# Patient Record
Sex: MALE | Race: WHITE | NOT HISPANIC OR LATINO | Employment: OTHER | URBAN - METROPOLITAN AREA
[De-identification: names, ages, dates, MRNs, and addresses within clinical notes are randomized per-mention and may not be internally consistent; named-entity substitution may affect disease eponyms.]

---

## 2017-06-27 ENCOUNTER — LAB REQUISITION (OUTPATIENT)
Dept: LAB | Facility: HOSPITAL | Age: 82
End: 2017-06-27
Payer: MEDICARE

## 2017-06-27 DIAGNOSIS — N39.0 URINARY TRACT INFECTION: ICD-10-CM

## 2017-06-27 PROCEDURE — 87077 CULTURE AEROBIC IDENTIFY: CPT | Performed by: UROLOGY

## 2017-06-27 PROCEDURE — 87186 SC STD MICRODIL/AGAR DIL: CPT | Performed by: UROLOGY

## 2017-06-27 PROCEDURE — 87086 URINE CULTURE/COLONY COUNT: CPT | Performed by: UROLOGY

## 2017-06-29 LAB — BACTERIA UR CULT: NORMAL

## 2018-06-27 DIAGNOSIS — N40.1 BPH WITH OBSTRUCTION/LOWER URINARY TRACT SYMPTOMS: Primary | ICD-10-CM

## 2018-06-27 DIAGNOSIS — N13.8 BPH WITH OBSTRUCTION/LOWER URINARY TRACT SYMPTOMS: Primary | ICD-10-CM

## 2018-06-27 RX ORDER — FINASTERIDE 5 MG/1
5 TABLET, FILM COATED ORAL DAILY
Qty: 30 TABLET | Refills: 0 | Status: SHIPPED | OUTPATIENT
Start: 2018-06-27 | End: 2018-07-09 | Stop reason: SDUPTHER

## 2018-07-09 ENCOUNTER — OFFICE VISIT (OUTPATIENT)
Dept: UROLOGY | Facility: MEDICAL CENTER | Age: 83
End: 2018-07-09
Payer: MEDICARE

## 2018-07-09 VITALS
HEIGHT: 67 IN | WEIGHT: 182 LBS | BODY MASS INDEX: 28.56 KG/M2 | DIASTOLIC BLOOD PRESSURE: 70 MMHG | SYSTOLIC BLOOD PRESSURE: 130 MMHG

## 2018-07-09 DIAGNOSIS — N13.8 BPH WITH OBSTRUCTION/LOWER URINARY TRACT SYMPTOMS: ICD-10-CM

## 2018-07-09 DIAGNOSIS — N40.1 BENIGN PROSTATIC HYPERPLASIA WITH LOWER URINARY TRACT SYMPTOMS, SYMPTOM DETAILS UNSPECIFIED: Primary | ICD-10-CM

## 2018-07-09 DIAGNOSIS — N40.1 BPH WITH OBSTRUCTION/LOWER URINARY TRACT SYMPTOMS: ICD-10-CM

## 2018-07-09 PROCEDURE — 99214 OFFICE O/P EST MOD 30 MIN: CPT | Performed by: UROLOGY

## 2018-07-09 RX ORDER — LEVOTHYROXINE SODIUM 0.05 MG/1
75 TABLET ORAL DAILY
COMMUNITY

## 2018-07-09 RX ORDER — OMEPRAZOLE 20 MG/1
20 CAPSULE, DELAYED RELEASE ORAL DAILY
COMMUNITY
End: 2022-05-10 | Stop reason: ALTCHOICE

## 2018-07-09 RX ORDER — TAMSULOSIN HYDROCHLORIDE 0.4 MG/1
0.4 CAPSULE ORAL
COMMUNITY
End: 2018-07-09 | Stop reason: SDUPTHER

## 2018-07-09 RX ORDER — ATORVASTATIN CALCIUM 10 MG/1
10 TABLET, FILM COATED ORAL DAILY
COMMUNITY
End: 2022-02-08

## 2018-07-09 RX ORDER — FINASTERIDE 5 MG/1
5 TABLET, FILM COATED ORAL DAILY
Qty: 30 TABLET | Refills: 3 | Status: SHIPPED | OUTPATIENT
Start: 2018-07-09 | End: 2018-08-14 | Stop reason: SDUPTHER

## 2018-07-09 RX ORDER — DIPHENOXYLATE HYDROCHLORIDE AND ATROPINE SULFATE 2.5; .025 MG/1; MG/1
1 TABLET ORAL DAILY
COMMUNITY
End: 2021-09-01

## 2018-07-09 RX ORDER — DONEPEZIL HYDROCHLORIDE 5 MG/1
5 TABLET, FILM COATED ORAL
COMMUNITY
End: 2022-02-08

## 2018-07-09 RX ORDER — TAMSULOSIN HYDROCHLORIDE 0.4 MG/1
0.4 CAPSULE ORAL
Qty: 90 CAPSULE | Refills: 3 | Status: SHIPPED | OUTPATIENT
Start: 2018-07-09 | End: 2019-04-23 | Stop reason: SDUPTHER

## 2018-07-09 RX ORDER — AMLODIPINE BESYLATE 5 MG/1
5 TABLET ORAL DAILY
COMMUNITY

## 2018-07-09 NOTE — LETTER
July 9, 2018     Eduardo Henderson MD  [address] [place] [address]    Patient: Bobby Ernst   YOB: 1924   Date of Visit: 7/9/2018       Dear Dr Isela Harris:    Thank you for referring Nova Schwab to me for evaluation  Below are my notes for this consultation  If you have questions, please do not hesitate to call me  I look forward to following your patient along with you  Sincerely,        Loan Davidson MD        CC: No Recipients  Loan Davidson MD  7/9/2018 12:05 PM  Sign at close encounter  Assessment/Plan:   1  BPH with obstructive symptoms  The patient currently is voiding adequately and continues to take tamsulosin and finasteride without side effect and with excellent effect  The patient denies gross hematuria dysuria frequency urgency or incontinence  Diagnoses and all orders for this visit:    Benign prostatic hyperplasia with lower urinary tract symptoms, symptom details unspecified  -     Comprehensive metabolic panel; Future    Other orders  -     amLODIPine (NORVASC) 5 mg tablet; Take 5 mg by mouth daily  -     atorvastatin (LIPITOR) 10 mg tablet; Take 10 mg by mouth daily  -     multivitamin (THERAGRAN) TABS; Take 1 tablet by mouth daily  -     donepezil (ARICEPT) 5 mg tablet; Take 5 mg by mouth daily at bedtime  -     Multiple Vitamins-Minerals (PRESERVISION AREDS 2 PO); Take by mouth  -     levothyroxine 50 mcg tablet; Take 50 mcg by mouth daily  -     bimatoprost (LUMIGAN) 0 01 % ophthalmic drops; 1 drop daily at bedtime  -     omeprazole (PriLOSEC) 20 mg delayed release capsule; Take 20 mg by mouth daily  -     tamsulosin (FLOMAX) 0 4 mg; Take 0 4 mg by mouth daily with dinner  -     Albuterol Sulfate (VENTOLIN HFA IN); Inhale 90 Inhalers          Subjective:     Patient ID: Bobby Ernst is a 80 y o  male  HPI 27-year-old male on alpha blockade and 5 alpha reductase inhibition for BPH    He denies dysuria frequency urgency gross hematuria and nocturia  He is pleased with the results of pharmacotherapy  Review of Systems   Constitutional: Negative  HENT: Negative  Respiratory: Negative  Cardiovascular: Negative  Gastrointestinal: Negative  Genitourinary: Negative  Musculoskeletal: Negative  Neurological: Negative  Hematological: Negative  Psychiatric/Behavioral: Negative  Objective:     Physical Exam   Constitutional: He is oriented to person, place, and time  He appears well-nourished  No distress  HENT:   Head: Atraumatic  Eyes: Conjunctivae and EOM are normal    Neck: Neck supple  Pulmonary/Chest: Effort normal  No respiratory distress  Abdominal: Soft  Neurological: He is alert and oriented to person, place, and time  Psychiatric: He has a normal mood and affect  His behavior is normal  Judgment and thought content normal    Vitals reviewed

## 2018-07-09 NOTE — PROGRESS NOTES
Assessment/Plan:   1  BPH with obstructive symptoms  The patient currently is voiding adequately and continues to take tamsulosin and finasteride without side effect and with excellent effect  The patient denies gross hematuria dysuria frequency urgency or incontinence  Diagnoses and all orders for this visit:    Benign prostatic hyperplasia with lower urinary tract symptoms, symptom details unspecified  -     Comprehensive metabolic panel; Future    Other orders  -     amLODIPine (NORVASC) 5 mg tablet; Take 5 mg by mouth daily  -     atorvastatin (LIPITOR) 10 mg tablet; Take 10 mg by mouth daily  -     multivitamin (THERAGRAN) TABS; Take 1 tablet by mouth daily  -     donepezil (ARICEPT) 5 mg tablet; Take 5 mg by mouth daily at bedtime  -     Multiple Vitamins-Minerals (PRESERVISION AREDS 2 PO); Take by mouth  -     levothyroxine 50 mcg tablet; Take 50 mcg by mouth daily  -     bimatoprost (LUMIGAN) 0 01 % ophthalmic drops; 1 drop daily at bedtime  -     omeprazole (PriLOSEC) 20 mg delayed release capsule; Take 20 mg by mouth daily  -     tamsulosin (FLOMAX) 0 4 mg; Take 0 4 mg by mouth daily with dinner  -     Albuterol Sulfate (VENTOLIN HFA IN); Inhale 90 Inhalers          Subjective:     Patient ID: Germania Santos is a 80 y o  male  HPI 75-year-old male on alpha blockade and 5 alpha reductase inhibition for BPH  He denies dysuria frequency urgency gross hematuria and nocturia  He is pleased with the results of pharmacotherapy  Review of Systems   Constitutional: Negative  HENT: Negative  Respiratory: Negative  Cardiovascular: Negative  Gastrointestinal: Negative  Genitourinary: Negative  Musculoskeletal: Negative  Neurological: Negative  Hematological: Negative  Psychiatric/Behavioral: Negative  Objective:     Physical Exam   Constitutional: He is oriented to person, place, and time  He appears well-nourished  No distress  HENT:   Head: Atraumatic     Eyes: Conjunctivae and EOM are normal    Neck: Neck supple  Pulmonary/Chest: Effort normal  No respiratory distress  Abdominal: Soft  Neurological: He is alert and oriented to person, place, and time  Psychiatric: He has a normal mood and affect  His behavior is normal  Judgment and thought content normal    Vitals reviewed

## 2018-08-14 DIAGNOSIS — N13.8 BPH WITH OBSTRUCTION/LOWER URINARY TRACT SYMPTOMS: ICD-10-CM

## 2018-08-14 DIAGNOSIS — N40.1 BPH WITH OBSTRUCTION/LOWER URINARY TRACT SYMPTOMS: ICD-10-CM

## 2018-08-14 RX ORDER — FINASTERIDE 5 MG/1
TABLET, FILM COATED ORAL
Qty: 28 TABLET | Refills: 0 | Status: SHIPPED | OUTPATIENT
Start: 2018-08-14 | End: 2019-03-12 | Stop reason: SDUPTHER

## 2018-08-24 DIAGNOSIS — N13.8 BPH WITH OBSTRUCTION/LOWER URINARY TRACT SYMPTOMS: ICD-10-CM

## 2018-08-24 DIAGNOSIS — N40.1 BPH WITH OBSTRUCTION/LOWER URINARY TRACT SYMPTOMS: ICD-10-CM

## 2018-08-24 RX ORDER — FINASTERIDE 5 MG/1
5 TABLET, FILM COATED ORAL DAILY
Qty: 90 TABLET | Refills: 3 | Status: CANCELLED | OUTPATIENT
Start: 2018-08-24

## 2018-08-24 NOTE — TELEPHONE ENCOUNTER
Patient called requesting refill on Finasteride 5mg    Request for same, 90 day supply with 3 refills was queued and forwarded to Dr Paramjit Moon for approval

## 2018-08-27 NOTE — TELEPHONE ENCOUNTER
Son calls back today stating he's cancelling his appointment with our practice and following up with the family physician  He would like the Finasteride script DISCONTINUED with our practice so that he only has ONE script running from the PCP  I called both [id number] and [id number] and neither pharmacy has an active prescription on file  No further action required

## 2018-10-10 PROCEDURE — 88305 TISSUE EXAM BY PATHOLOGIST: CPT | Performed by: PATHOLOGY

## 2018-10-11 ENCOUNTER — LAB REQUISITION (OUTPATIENT)
Dept: LAB | Facility: HOSPITAL | Age: 83
End: 2018-10-11
Payer: MEDICARE

## 2018-10-11 DIAGNOSIS — D48.5 NEOPLASM OF UNCERTAIN BEHAVIOR OF SKIN: ICD-10-CM

## 2019-03-11 ENCOUNTER — TELEPHONE (OUTPATIENT)
Dept: UROLOGY | Facility: AMBULATORY SURGERY CENTER | Age: 84
End: 2019-03-11

## 2019-03-11 NOTE — TELEPHONE ENCOUNTER
Patient's son Venancio Olsen called said he would like to setup a voiding trial  He can be reached at [phone]

## 2019-03-12 ENCOUNTER — TELEPHONE (OUTPATIENT)
Dept: UROLOGY | Facility: MEDICAL CENTER | Age: 84
End: 2019-03-12

## 2019-03-12 ENCOUNTER — OFFICE VISIT (OUTPATIENT)
Dept: UROLOGY | Facility: MEDICAL CENTER | Age: 84
End: 2019-03-12
Payer: MEDICARE

## 2019-03-12 VITALS
WEIGHT: 174 LBS | HEART RATE: 53 BPM | HEIGHT: 65 IN | BODY MASS INDEX: 28.99 KG/M2 | SYSTOLIC BLOOD PRESSURE: 110 MMHG | DIASTOLIC BLOOD PRESSURE: 54 MMHG

## 2019-03-12 DIAGNOSIS — R33.9 URINARY RETENTION: Primary | ICD-10-CM

## 2019-03-12 DIAGNOSIS — N13.8 BPH WITH OBSTRUCTION/LOWER URINARY TRACT SYMPTOMS: ICD-10-CM

## 2019-03-12 DIAGNOSIS — N40.1 BPH WITH OBSTRUCTION/LOWER URINARY TRACT SYMPTOMS: ICD-10-CM

## 2019-03-12 PROCEDURE — 99214 OFFICE O/P EST MOD 30 MIN: CPT | Performed by: UROLOGY

## 2019-03-12 RX ORDER — CYCLOSPORINE 0.5 MG/ML
1 EMULSION OPHTHALMIC
COMMUNITY
Start: 2019-03-02 | End: 2019-03-12 | Stop reason: ALTCHOICE

## 2019-03-12 RX ORDER — ARFORMOTEROL TARTRATE 15 UG/2ML
15 SOLUTION RESPIRATORY (INHALATION)
COMMUNITY
Start: 2019-03-02 | End: 2021-09-01

## 2019-03-12 RX ORDER — GUAIFENESIN 600 MG
600 TABLET, EXTENDED RELEASE 12 HR ORAL
COMMUNITY
Start: 2019-03-02 | End: 2022-05-10 | Stop reason: ALTCHOICE

## 2019-03-12 RX ORDER — BUDESONIDE 0.5 MG/2ML
0.5 INHALANT ORAL
COMMUNITY
Start: 2019-03-02 | End: 2021-09-01

## 2019-03-12 RX ORDER — METOPROLOL SUCCINATE 50 MG/1
50 TABLET, EXTENDED RELEASE ORAL DAILY
Refills: 1 | COMMUNITY
Start: 2019-02-19 | End: 2022-05-10 | Stop reason: ALTCHOICE

## 2019-03-12 RX ORDER — FINASTERIDE 5 MG/1
5 TABLET, FILM COATED ORAL DAILY
Qty: 90 TABLET | Refills: 3 | Status: SHIPPED | OUTPATIENT
Start: 2019-03-12 | End: 2020-03-26

## 2019-03-12 NOTE — TELEPHONE ENCOUNTER
Spoke to pt's son and he states Pt is at [facility] for rehab for a couple day to call that facility

## 2019-03-12 NOTE — TELEPHONE ENCOUNTER
Per Dr Aron Parra he would like pt to have a PVR tomorrow and even if the pt has high residuals and is comfortable do not put a catheter in only if pt is uncomfortable  Pt also should be taking both Finasteride and Flomax per Dr Aorn Parra  [facility] and Tigre Comment stated you need to talk to pt or his family  Left message for Pt's son to call back

## 2019-03-12 NOTE — PROGRESS NOTES
Assessment/Plan:  1  BPH with bladder outlet obstruction-continue Flomax and reinstitute finasteride which apparently was stopped when the patient ran out    2  Urinary retention-voiding trial today after his bladder is irrigated free of any clots that may be in their secondary to his hematuria after Cunningham placement  Will for follow with bladder scanning for postvoid residual volume however the threshold for re-catheterization will be quite high  As long as the patient is comfortable voiding adequately without discomfort abdominal pain or renal insufficiency  no Cunningham catheter will be recommended  If hematuria continues and does not resolve cystourethroscopy will be considered  No problem-specific Assessment & Plan notes found for this encounter  Diagnoses and all orders for this visit:    Urinary retention  -     POCT Measure PVR; Future    BPH with obstruction/lower urinary tract symptoms  -     finasteride (PROSCAR) 5 mg tablet; Take 1 tablet (5 mg total) by mouth daily BPH    Other orders  -     arformoterol (BROVANA) 15 mcg/2 mL nebulizer solution; Inhale 15 mcg  -     budesonide (PULMICORT) 0 5 mg/2 mL nebulizer solution; Inhale 0 5 mg  -     Discontinue: cycloSPORINE (RESTASIS) 0 05 % ophthalmic emulsion; Apply 1 drop to eye  -     guaiFENesin (MUCINEX) 600 mg 12 hr tablet; Take 600 mg by mouth  -     metoprolol succinate (TOPROL-XL) 50 mg 24 hr tablet; Take 50 mg by mouth daily          Subjective:      Patient ID: Yosef Hayes is a 80 y o  male  Chief complaint:  Urinary retention    History of present illness this is a 35-year-old gentleman who was known by me seen in June of 2018 at which time he was on both alpha blockade and finasteride  The patient was voiding adequately with a good urinary stream no obstructive irritative symptoms by history    Recently he was admitted emergently for an acute respiratory infection to [facility]   After being treated over the course of a week the patient ultimately developed urinary retention  Specifically the patient had been placed on steroids and diuretics for congestive heart failure and respiratory distress  Because of the significant urinary loaded a Cunningham catheter was placed  The patient had some gross hematuria after Cunningham catheter placement but by enlarged the Cunningham catheter worked well while it was in  The patient was given a voiding trial and according to family members he was able to urinate adequately however a bladder scan for postvoid residual was performed and a catheter reinserted for an amount unknown  The patient now presents for a voiding trial       The following portions of the patient's history were reviewed and updated as appropriate: allergies, current medications, past family history, past medical history, past social history, past surgical history and problem list     Review of Systems   Constitutional: Positive for activity change, appetite change, chills and fatigue  HENT: Positive for congestion  Eyes: Negative  Respiratory: Positive for shortness of breath and wheezing  Cardiovascular: Negative  Gastrointestinal: Negative  Genitourinary: Negative  Musculoskeletal: Negative  Skin: Negative  Neurological: Negative  Psychiatric/Behavioral: Negative  Objective:      /54   Pulse (!) 53   Ht 5' 5" (1 651 m)   Wt 78 9 kg (174 lb)   BMI 28 96 kg/m²          Physical Exam   Constitutional: He is oriented to person, place, and time  He appears well-developed and well-nourished  HENT:   Head: Atraumatic  Eyes: EOM are normal    Neck: Neck supple  Pulmonary/Chest: He has wheezes  Abdominal: Soft  Neurological: He is alert and oriented to person, place, and time  Psychiatric: He has a normal mood and affect  His behavior is normal  Judgment and thought content normal    Vitals reviewed

## 2019-03-13 NOTE — TELEPHONE ENCOUNTER
Lani called back wanting to know if patient needs an antibiotic order  Patient's son stated he was told he needed it  Please clarify and call and send order

## 2019-03-13 NOTE — TELEPHONE ENCOUNTER
Spoke to son, states they received call from [facility] re: antibiotic  No documentation in Epic  Instructed to call if prescription is finasteride that was sent in yesterday and will direct to Dr Allan Crowell for order

## 2019-03-13 NOTE — TELEPHONE ENCOUNTER
Will direct to Dr Elis Castro  Spoke to [employer]  Pt was discharged to home  Will contact family to notified them  Left message for son to call

## 2019-03-26 ENCOUNTER — OFFICE VISIT (OUTPATIENT)
Dept: UROLOGY | Facility: MEDICAL CENTER | Age: 84
End: 2019-03-26
Payer: MEDICARE

## 2019-03-26 VITALS
BODY MASS INDEX: 28.99 KG/M2 | WEIGHT: 174 LBS | SYSTOLIC BLOOD PRESSURE: 116 MMHG | DIASTOLIC BLOOD PRESSURE: 60 MMHG | HEIGHT: 65 IN

## 2019-03-26 DIAGNOSIS — R31.0 GROSS HEMATURIA: ICD-10-CM

## 2019-03-26 DIAGNOSIS — N13.8 BPH WITH OBSTRUCTION/LOWER URINARY TRACT SYMPTOMS: ICD-10-CM

## 2019-03-26 DIAGNOSIS — R33.9 URINARY RETENTION: Primary | ICD-10-CM

## 2019-03-26 DIAGNOSIS — N40.1 BPH WITH OBSTRUCTION/LOWER URINARY TRACT SYMPTOMS: ICD-10-CM

## 2019-03-26 LAB — POST-VOID RESIDUAL VOLUME, ML POC: 192 ML

## 2019-03-26 PROCEDURE — 99214 OFFICE O/P EST MOD 30 MIN: CPT | Performed by: UROLOGY

## 2019-03-26 PROCEDURE — 51798 US URINE CAPACITY MEASURE: CPT | Performed by: UROLOGY

## 2019-03-26 NOTE — PROGRESS NOTES
Assessment/Plan:   1  Urinary retention-resolved  Two  BPH with obstructive symptoms-continue medical therapy and return in 1 year for PVR chemistry profile and refill of alpha blockers and finasteride  Diagnoses and all orders for this visit:    Urinary retention  Comments:  Resolved with mildly elevated PVR  Orders:  -     POCT Measure PVR    BPH with obstruction/lower urinary tract symptoms  -     Comprehensive metabolic panel; Future  -     POCT Measure PVR; Future    Gross hematuria  Comments:  Resolved          Subjective:     Patient ID: Boo Locke is a 80 y o  male  Chief complaint:  Urinary retention and BPH with obstructive symptoms    History of present illness:  70-year-old male who went into urinary retention due to clot urinary retention undergoing gross hematuria evaluation prior  The patient was given a voiding trial recently and currently is voiding adequately  PVR was mildly elevated at the 170 cc range  Patient currently has no complaints continues on alpha blockade utilizing tamsulosin and 5 alpha reductase utilizing finasteride  He has no current urologic complaints including no dysuria recurrent gross hematuria frequency urgency  Review of Systems   Constitutional: Negative  HENT: Negative  Respiratory: Negative  Cardiovascular: Negative  Gastrointestinal: Negative  Endocrine: Negative  Genitourinary:        See HPI   Neurological: Negative  Psychiatric/Behavioral: Positive for confusion  Objective:     Physical Exam   Constitutional: He is oriented to person, place, and time  He appears well-nourished  No distress  HENT:   Head: Atraumatic  Eyes: EOM are normal    Neck: Neck supple  Pulmonary/Chest: Effort normal    Abdominal: Soft  Neurological: He is alert and oriented to person, place, and time  Psychiatric: He has a normal mood and affect  His behavior is normal    Vitals reviewed

## 2019-04-23 DIAGNOSIS — N40.1 BENIGN PROSTATIC HYPERPLASIA WITH LOWER URINARY TRACT SYMPTOMS, SYMPTOM DETAILS UNSPECIFIED: ICD-10-CM

## 2019-04-23 RX ORDER — TAMSULOSIN HYDROCHLORIDE 0.4 MG/1
0.4 CAPSULE ORAL
Qty: 90 CAPSULE | Refills: 3 | Status: SHIPPED | OUTPATIENT
Start: 2019-04-23 | End: 2020-05-11

## 2020-03-26 DIAGNOSIS — N13.8 BPH WITH OBSTRUCTION/LOWER URINARY TRACT SYMPTOMS: ICD-10-CM

## 2020-03-26 DIAGNOSIS — N40.1 BPH WITH OBSTRUCTION/LOWER URINARY TRACT SYMPTOMS: ICD-10-CM

## 2020-03-26 RX ORDER — FINASTERIDE 5 MG/1
5 TABLET, FILM COATED ORAL DAILY
Qty: 90 TABLET | Refills: 3 | Status: SHIPPED | OUTPATIENT
Start: 2020-03-26

## 2020-03-31 ENCOUNTER — TELEPHONE (OUTPATIENT)
Dept: UROLOGY | Facility: MEDICAL CENTER | Age: 85
End: 2020-03-31

## 2020-04-23 ENCOUNTER — TELEPHONE (OUTPATIENT)
Dept: UROLOGY | Facility: MEDICAL CENTER | Age: 85
End: 2020-04-23

## 2020-05-10 DIAGNOSIS — N40.1 BENIGN PROSTATIC HYPERPLASIA WITH LOWER URINARY TRACT SYMPTOMS, SYMPTOM DETAILS UNSPECIFIED: ICD-10-CM

## 2020-05-11 RX ORDER — TAMSULOSIN HYDROCHLORIDE 0.4 MG/1
0.4 CAPSULE ORAL
Qty: 90 CAPSULE | Refills: 3 | Status: SHIPPED | OUTPATIENT
Start: 2020-05-11 | End: 2021-02-05

## 2021-02-05 DIAGNOSIS — N40.1 BENIGN PROSTATIC HYPERPLASIA WITH LOWER URINARY TRACT SYMPTOMS, SYMPTOM DETAILS UNSPECIFIED: ICD-10-CM

## 2021-02-05 RX ORDER — TAMSULOSIN HYDROCHLORIDE 0.4 MG/1
CAPSULE ORAL
Qty: 90 CAPSULE | Refills: 3 | Status: SHIPPED | OUTPATIENT
Start: 2021-02-05

## 2021-08-31 ENCOUNTER — TELEPHONE (OUTPATIENT)
Dept: OTHER | Facility: OTHER | Age: 86
End: 2021-08-31

## 2021-08-31 NOTE — TELEPHONE ENCOUNTER
Patient is a new admission at [facility]  Paged provider with callback details

## 2021-09-01 ENCOUNTER — NURSING HOME VISIT (OUTPATIENT)
Dept: WOUND CARE | Facility: HOSPITAL | Age: 86
End: 2021-09-01
Payer: MEDICARE

## 2021-09-01 ENCOUNTER — NURSING HOME VISIT (OUTPATIENT)
Dept: GERIATRICS | Facility: OTHER | Age: 86
End: 2021-09-01
Payer: MEDICARE

## 2021-09-01 VITALS
SYSTOLIC BLOOD PRESSURE: 121 MMHG | TEMPERATURE: 97.3 F | OXYGEN SATURATION: 97 % | DIASTOLIC BLOOD PRESSURE: 66 MMHG | BODY MASS INDEX: 27.92 KG/M2 | HEART RATE: 74 BPM | WEIGHT: 167.8 LBS

## 2021-09-01 DIAGNOSIS — I25.10 CORONARY ARTERY DISEASE WITHOUT ANGINA PECTORIS, UNSPECIFIED VESSEL OR LESION TYPE, UNSPECIFIED WHETHER NATIVE OR TRANSPLANTED HEART: ICD-10-CM

## 2021-09-01 DIAGNOSIS — I50.32 CHRONIC DIASTOLIC (CONGESTIVE) HEART FAILURE (HCC): ICD-10-CM

## 2021-09-01 DIAGNOSIS — F02.80 LATE ONSET ALZHEIMER'S DEMENTIA WITHOUT BEHAVIORAL DISTURBANCE (HCC): ICD-10-CM

## 2021-09-01 DIAGNOSIS — R05.3 CHRONIC COUGH: Primary | ICD-10-CM

## 2021-09-01 DIAGNOSIS — R26.2 AMBULATORY DYSFUNCTION: ICD-10-CM

## 2021-09-01 DIAGNOSIS — L25.8 DERMATITIS ASSOCIATED WITH INCONTINENCE: Primary | ICD-10-CM

## 2021-09-01 DIAGNOSIS — I10 ESSENTIAL HYPERTENSION: ICD-10-CM

## 2021-09-01 DIAGNOSIS — K21.9 GASTROESOPHAGEAL REFLUX DISEASE WITHOUT ESOPHAGITIS: ICD-10-CM

## 2021-09-01 DIAGNOSIS — R32 DERMATITIS ASSOCIATED WITH INCONTINENCE: Primary | ICD-10-CM

## 2021-09-01 DIAGNOSIS — G30.1 LATE ONSET ALZHEIMER'S DEMENTIA WITHOUT BEHAVIORAL DISTURBANCE (HCC): ICD-10-CM

## 2021-09-01 DIAGNOSIS — N40.0 BENIGN PROSTATIC HYPERPLASIA, UNSPECIFIED WHETHER LOWER URINARY TRACT SYMPTOMS PRESENT: ICD-10-CM

## 2021-09-01 PROBLEM — N18.2 CKD (CHRONIC KIDNEY DISEASE) STAGE 2, GFR 60-89 ML/MIN: Status: ACTIVE | Noted: 2021-09-01

## 2021-09-01 PROBLEM — N18.30 STAGE 3 CHRONIC KIDNEY DISEASE (HCC): Status: ACTIVE | Noted: 2021-09-01

## 2021-09-01 PROCEDURE — 99304 1ST NF CARE SF/LOW MDM 25: CPT | Performed by: NURSE PRACTITIONER

## 2021-09-01 PROCEDURE — 99306 1ST NF CARE HIGH MDM 50: CPT | Performed by: FAMILY MEDICINE

## 2021-09-01 RX ORDER — FOLIC ACID/MULTIVIT,IRON,MINER .4-18-35
1 TABLET,CHEWABLE ORAL DAILY
COMMUNITY

## 2021-09-01 RX ORDER — TORSEMIDE 10 MG/1
10 TABLET ORAL
COMMUNITY
End: 2022-05-10 | Stop reason: ALTCHOICE

## 2021-09-01 NOTE — ASSESSMENT & PLAN NOTE
Wt Readings from Last 3 Encounters:   09/01/21 76 1 kg (167 lb 12 8 oz)   03/26/19 78 9 kg (174 lb)   03/12/19 78 9 kg (174 lb)     Stable  No crackles, peripheral edema on physical exam  Last echo 3/17/2017 with severe hypokinesis of the inferobasilar wall, mild diastolic dysfunction, LVEF 55%    -Continue Torsemide 10 mg every other day  -Continue to monitor weights

## 2021-09-01 NOTE — ASSESSMENT & PLAN NOTE
Former smoker  Quit >40 years ago  Exam today without wheezing   -On 3L NC, continue  -Continue Albuterol neb  -Continue guaifenesin and tessalon perles as needed

## 2021-09-01 NOTE — LETTER
Patient:  Wali Calero   12/25/1924           AZALEA Velázquez saw Wali Calero for a wound care visit on 9/1/2021  See below for information relating to this visit  Chief Complaint   Patient presents with    New Patient Visit     Sacrum        Assessment/Plan:  1  Dermatitis associated with incontinence  Assessment & Plan:  Sacrum  - 100% epithelial  - local wound care with hydraguard  - follow up next week    Orders:  -     Wound cleansing and dressings; Future    2  Ambulatory dysfunction  Assessment & Plan:  - on PT      3  Late onset Alzheimer's dementia without behavioral disturbance ([facility] [address] )           Orders:  Wali Calero  12/25/1924  Orders Placed This Encounter   Procedures    Wound cleansing and dressings     Wound:  Sacrum  Discontinue previous wound order  Cleanse the sacrum with soap and water, pat dry  Apply hydraguard to wound bed  Frequency : twice a day and prn for soiling  Offload all wounds  Turn and reposition frequently, maximum of every two hours  Instruct / Assist with weight shifting when in chair  Increase protein intake  Monitor for any sign of infection or worsening, inform PCP or patient's primary physician in your facility  Standing Status:   Future     Standing Expiration Date:   9/1/2022         Follow Up:  Return in about 1 week (around 9/8/2021)  [address] hours are 8:00 am - 4:30 pm Monday through Friday  The center phone number is [phone]  You can also contact me directly thru my email at [facility]  [email or web address]  [email or web address] or thru tiger text  If it is an emergency, please contact the PCP or patient's attending physician in your facility       Sincerely,    Electronically signed by AZALEA Velázquez    Patient : Wali Calero    12/25/1924

## 2021-09-01 NOTE — PROGRESS NOTES
[address]  [address] [place] [address] 36, [address] [facility]   [facility]  History and Physical    NAME: Leonard Carney  AGE: 80 y o  SEX: male [id number]    DATE OF ENCOUNTER: 9/1/2021    Code status:  DNR/DNI    Assessment and Plan     Chronic cough  Former smoker  Quit >40 years ago  Exam today without wheezing   -On 3L NC, continue  -Continue Albuterol neb  -Continue guaifenesin and tessalon perles as needed    Gastroesophageal reflux disease without esophagitis  Continue omeprazole 20 mg daily    CAD (coronary artery disease)  Denies any chest pain today  Continue aspirin 81 mg daily, metoprolol tartarate 50 mg daily  Continue Atorvastatin 10 mg daily      Chronic diastolic (congestive) heart failure (HCC)  Wt Readings from Last 3 Encounters:   09/01/21 76 1 kg (167 lb 12 8 oz)   03/26/19 78 9 kg (174 lb)   03/12/19 78 9 kg (174 lb)     Stable  No crackles, peripheral edema on physical exam  Last echo 3/17/2017 with severe hypokinesis of the inferobasilar wall, mild diastolic dysfunction, LVEF 55%    -Continue Torsemide 10 mg every other day  -Continue to monitor weights    Essential hypertension  BP today 121/66, well controlled  -Currently on amlodipine 5 mg daily, consider discontinue    CKD (chronic kidney disease) stage 2, GFR 60-89 ml/min  Baseline Cr/GFR around 0 9/66, last checked 1/2020  Will get a BMP at this time    Late onset Alzheimer dementia ([facility] [address] )  Alert, oriented x1 to person, not to place or time  -Continue donepezil 5 mg HS    BPH (benign prostatic hyperplasia)  Last encounter with [facility] urology  (3/2019)  tamsulosin 0 4 mg with dinner  Proscar (finasteride) 5 mg daily        All medications and routine orders were reviewed and updated as needed      Plan discussed with: Attending    Chief Complaint     Seen for admission at [address]    History of Present Illness     This is a 80 Year old male  With a history of  HTN, CAD, hx of CVA, hypothyroidism, MDD, anxiety, BPH with hx of obstruction, Osteoarthritis, PUD, hx of colonic polyp and history of skin cancer (melanoma), presenting as a new admission to [facility]  Patient reports doing alright today, he has no acute complaints  ACP: DNR/DNI (as per chart review 2/23/2019 [facility] admission     HISTORY:  Past Medical History:   Diagnosis Date    Anxiety     BPH with obstruction/lower urinary tract symptoms     CAD (coronary artery disease)     Colon polyp     Glaucoma     Gross hematuria     History of thrombolytic therapy     Hypertension     Hypothyroidism     Major depressive disorder     Osteoarthritis     Paraphimosis     Peptic ulcer disease     SIRS of non-infectious origin w/o acute organ dysfunction (HCC)     Skin cancer (melanoma) (HCC)     SOB (shortness of breath)     Stroke ([facility] [address] )    Gilberto Zuly Uncircumcised male      No family history on file  Social History     Socioeconomic History    Marital status:      Spouse name: None    Number of children: None    Years of education: None    Highest education level: None   Occupational History    None   Tobacco Use    Smoking status: Former Smoker    Smokeless tobacco: Never Used   Substance and Sexual Activity    Alcohol use: No    Drug use: No    Sexual activity: None   Other Topics Concern    None   Social History Narrative    None     Social Determinants of Health     Financial Resource Strain:     Difficulty of Paying Living Expenses:    Food Insecurity:     Worried About Running Out of Food in the Last Year:     Ran Out of Food in the Last Year:    Transportation Needs:     Lack of Transportation (Medical):      Lack of Transportation (Non-Medical):    Physical Activity:     Days of Exercise per Week:     Minutes of Exercise per Session:    Stress:     Feeling of Stress :    Social Connections:     Frequency of Communication with Friends and Family:     Frequency of Social Gatherings with Friends and Family:     Attends Nondenominational Services:     Active Member of Clubs or Organizations:     Attends Club or Organization Meetings:     Marital Status:    Intimate Partner Violence:     Fear of Current or Ex-Partner:     Emotionally Abused:     Physically Abused:     Sexually Abused: Allergies:  No Known Allergies    Review of Systems     Review of Systems   Constitutional: Negative for appetite change, chills, fatigue and fever  HENT: Negative for congestion, ear pain, postnasal drip, rhinorrhea, sinus pain and sore throat  Eyes: Negative for visual disturbance  Respiratory: Negative for cough, chest tightness and shortness of breath  Cardiovascular: Negative for chest pain, palpitations and leg swelling  Gastrointestinal: Negative for abdominal pain, constipation, diarrhea, nausea and vomiting  Endocrine: Negative for cold intolerance, heat intolerance, polydipsia and polyuria  Genitourinary: Negative for decreased urine volume, difficulty urinating, dysuria, flank pain, frequency and hematuria  Musculoskeletal: Negative for arthralgias  Skin: Negative for color change, pallor, rash and wound  Allergic/Immunologic: Negative for food allergies  Neurological: Negative for dizziness, tremors, weakness, light-headedness, numbness and headaches  Psychiatric/Behavioral: Negative for agitation, confusion, decreased concentration, hallucinations, sleep disturbance and suicidal ideas  The patient is not nervous/anxious  Medications and orders     All medications reviewed and updated in Nursing Home EMR  Objective     Visit Vitals  /66   Pulse 74   Temp (!) 97 3 °F (36 3 °C)   Wt 76 1 kg (167 lb 12 8 oz)   SpO2 97%   BMI 27 92 kg/m²   Smoking Status Former Smoker   BSA 1 84 m²       Physical Exam  Vitals reviewed  Constitutional:       General: He is not in acute distress  HENT:      Head: Normocephalic and atraumatic        Nose: Nose normal       Comments: Nasal canula in place  Mild dry blood at right nasal entry     Mouth/Throat:      Pharynx: No oropharyngeal exudate  Eyes:      General: No scleral icterus  Extraocular Movements: Extraocular movements intact  Comments: Left eye opaque (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, per patient)   Neck:      Thyroid: No thyromegaly  Trachea: No tracheal deviation  Cardiovascular:      Rate and Rhythm: Normal rate and regular rhythm  Heart sounds: Normal heart sounds  No murmur heard  Pulmonary:      Effort: Pulmonary effort is normal  No respiratory distress  Breath sounds: Normal breath sounds  No wheezing  Chest:      Chest wall: No tenderness  Abdominal:      General: Bowel sounds are normal  There is no distension  Palpations: Abdomen is soft  There is no mass  Tenderness: There is no abdominal tenderness  There is no guarding  Musculoskeletal:         General: No swelling, tenderness or deformity  Normal range of motion  Cervical back: Normal range of motion and neck supple  Right lower leg: No edema  Left lower leg: No edema  Lymphadenopathy:      Cervical: No cervical adenopathy  Skin:     General: Skin is warm  Capillary Refill: Capillary refill takes less than 2 seconds  Coloration: Skin is not pale  Findings: Rash (multiple seborrheic keratosis over the abdomen ) present  No erythema  Neurological:      Mental Status: He is alert  Cranial Nerves: No cranial nerve deficit  Gait: Gait abnormal (ambulatory dysfunction; walks with walker)  Comments: Alert, oriented to person but not to place or time   Psychiatric:         Mood and Affect: Mood normal          Pertinent Laboratory/Diagnostic Studies: The following labs/studies were reviewed please see chart or hospital paperwork for details        - Counseling Documentation: patient was counseled regarding: prognosis      Ciara Wolff MD  09/01/21  11:56 AM

## 2021-09-01 NOTE — PATIENT INSTRUCTIONS
Orders Placed This Encounter   Procedures    Wound cleansing and dressings     Wound:  Sacrum  Discontinue previous wound order  Cleanse the sacrum with soap and water, pat dry  Apply hydraguard to wound bed  Frequency : twice a day and prn for soiling  Offload all wounds  Turn and reposition frequently, maximum of every two hours  Instruct / Assist with weight shifting when in chair  Increase protein intake  Monitor for any sign of infection or worsening, inform PCP or patient's primary physician in your facility       Standing Status:   Future     Standing Expiration Date:   9/1/2022

## 2021-09-01 NOTE — ASSESSMENT & PLAN NOTE
Last encounter with [facility] urology  (3/2019)  tamsulosin 0 4 mg with dinner  Proscar (finasteride) 5 mg daily

## 2021-09-02 NOTE — PROGRESS NOTES
[address] MANAGEMENT   AND HYPERBARIC MEDICINE CENTER       Patient ID: Bobby Ernst is a 80 y o  male Date of Birth 12/25/1924     Location of Service: [address] [place] [address]    Performed wound round with: Wound team      Chief Complaint   Patient presents with    New Patient Visit     Sacrum       Wound Instructions:  Orders Placed This Encounter   Procedures    Wound cleansing and dressings     Wound:  Sacrum  Discontinue previous wound order  Cleanse the sacrum with soap and water, pat dry  Apply hydraguard to wound bed  Frequency : twice a day and prn for soiling  Offload all wounds  Turn and reposition frequently, maximum of every two hours  Instruct / Assist with weight shifting when in chair  Increase protein intake  Monitor for any sign of infection or worsening, inform PCP or patient's primary physician in your facility  Standing Status:   Future     Standing Expiration Date:   9/1/2022       Allergies  Patient has no known allergies  Assessment & Plan:  1  Dermatitis associated with incontinence  Assessment & Plan:  Sacrum  - 100% epithelial  - local wound care with hydraguard  - follow up next week    Orders:  -     Wound cleansing and dressings; Future    2  Ambulatory dysfunction  Assessment & Plan:  - on PT      3  Late onset Alzheimer's dementia without behavioral disturbance (HCC)           Subjective: This is a 80year old female referred to our service for wound on her sacrum  As pre report, the patient had the wound POA to [address] [place] [address]  Etiology : MASD  Severity : no sign of infection  Patient is incontinent of bowel  No current treatment      Review of Systems   Unable to perform ROS: Dementia       Objective: There were no vitals taken for this visit  Physical Exam  Constitutional:       Appearance: Normal appearance  HENT:      Head: Normocephalic and atraumatic        Nose: Nose normal       Mouth/Throat:      Mouth: Mucous membranes are moist    Eyes: Conjunctiva/sclera: Conjunctivae normal    Cardiovascular:      Rate and Rhythm: Normal rate  Pulses: Normal pulses  Pulmonary:      Comments: With non- productive cough  Abdominal:      Tenderness: There is no abdominal tenderness  Musculoskeletal:      Cervical back: Normal range of motion  Comments: LROM   Skin:     Findings: Lesion present  Comments: Location Sacrum wound bed - 0 5 x 0 2 x 0 1 cm , no undermining, no tunneling, 100 % epithelial, 0%granulation, 0%slough, exudate - no drainage, no malodor ( assess after dressing removal and cleansing), wound edge - attached to base, periwound - intact  No localized sign of infection, Denies pain     Neurological:      Mental Status: He is alert  Gait: Gait abnormal    Psychiatric:         Mood and Affect: Mood normal          Behavior: Behavior normal               Procedures           Patient's care was coordinated with nursing facility staff  Recent vitals, labs and updated medications were reviewed on EMR or chart system of facility   Past Medical, surgical, social, medication and allergy history and patient's previous records were reviewed and updated as appropriate:     Patient Active Problem List   Diagnosis    Chronic cough    Gastroesophageal reflux disease without esophagitis    CAD (coronary artery disease)    Chronic diastolic (congestive) heart failure ([facility] [address] )    Essential hypertension    Late onset Alzheimer dementia ([facility] [address] )    BPH (benign prostatic hyperplasia)    Dermatitis associated with incontinence    Ambulatory dysfunction     Past Medical History:   Diagnosis Date    Anxiety     BPH with obstruction/lower urinary tract symptoms     CAD (coronary artery disease)     Colon polyp     Glaucoma     Gross hematuria     History of thrombolytic therapy     Hypertension     Hypothyroidism     Major depressive disorder     Osteoarthritis     Paraphimosis     Peptic ulcer disease     SIRS of non-infectious origin w/o acute organ dysfunction (HCC)     Skin cancer (melanoma) (HCC)     SOB (shortness of breath)     Stroke (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      Past Surgical History:   Procedure Laterality Date    CYSTOSCOPY  05/27/2016    TRANSURETHRAL RESECTION OF PROSTATE       Social History     Socioeconomic History    Marital status:      Spouse name: None    Number of children: None    Years of education: None    Highest education level: None   Occupational History    None   Tobacco Use    Smoking status: Former Smoker    Smokeless tobacco: Never Used   Substance and Sexual Activity    Alcohol use: No    Drug use: No    Sexual activity: None   Other Topics Concern    None   Social History Narrative    None     Social Determinants of Health     Financial Resource Strain:     Difficulty of Paying Living Expenses:    Food Insecurity:     Worried About Running Out of Food in the Last Year:     Ran Out of Food in the Last Year:    Transportation Needs:     Lack of Transportation (Medical):      Lack of Transportation (Non-Medical):    Physical Activity:     Days of Exercise per Week:     Minutes of Exercise per Session:    Stress:     Feeling of Stress :    Social Connections:     Frequency of Communication with Friends and Family:     Frequency of Social Gatherings with Friends and Family:     Attends Zoroastrian Services:     Active Member of Clubs or Organizations:     Attends Club or Organization Meetings:     Marital Status:    Intimate Partner Violence:     Fear of Current or Ex-Partner:     Emotionally Abused:     Physically Abused:     Sexually Abused:         Current Outpatient Medications:     amLODIPine (NORVASC) 5 mg tablet, Take 5 mg by mouth daily, Disp: , Rfl:     atorvastatin (LIPITOR) 10 mg tablet, Take 10 mg by mouth daily, Disp: , Rfl:     donepezil (ARICEPT) 5 mg tablet, Take 5 mg by mouth daily at bedtime, Disp: , Rfl:     finasteride (PROSCAR) 5 mg tablet, TAKE 1 TABLET (5 MG TOTAL) BY MOUTH DAILY BPH, Disp: 90 tablet, Rfl: 3    guaiFENesin (MUCINEX) 600 mg 12 hr tablet, Take 600 mg by mouth, Disp: , Rfl:     levothyroxine 50 mcg tablet, Take 50 mcg by mouth daily, Disp: , Rfl:     metoprolol succinate (TOPROL-XL) 50 mg 24 hr tablet, Take 50 mg by mouth daily, Disp: , Rfl: 1    multivitamin-iron-minerals-folic acid (CENTRUM) chewable tablet, Chew 1 tablet daily, Disp: , Rfl:     omeprazole (PriLOSEC) 20 mg delayed release capsule, Take 20 mg by mouth daily, Disp: , Rfl:     tamsulosin (FLOMAX) 0 4 mg, TAKE 1 CAPSULE BY MOUTH EVERY DAY WITH DINNER, Disp: 90 capsule, Rfl: 3    torsemide (DEMADEX) 10 mg tablet, Take 10 mg by mouth daily, Disp: , Rfl:   History reviewed  No pertinent family history  Coordination of Care: Wound team aware of the treatment plan    Patient / Staff education : Staff was given education on sign of infection and pressure ulcer prevention  Staff verbalized understanding     Follow up :  Return in about 1 week (around 9/8/2021)  Voice-recognition software may have been used in the preparation of this document  Occasional wrong word or "sound-alike" substitutions may have occurred due to the inherent limitations of voice recognition software  Interpretation should be guided by AZALEA Thayer

## 2021-09-08 ENCOUNTER — DOCUMENTATION (OUTPATIENT)
Dept: WOUND CARE | Facility: HOSPITAL | Age: 86
End: 2021-09-08
Payer: MEDICARE

## 2021-09-08 ENCOUNTER — NURSING HOME VISIT (OUTPATIENT)
Dept: GERIATRICS | Facility: OTHER | Age: 86
End: 2021-09-08
Payer: MEDICARE

## 2021-09-08 DIAGNOSIS — I50.32 CHRONIC DIASTOLIC (CONGESTIVE) HEART FAILURE (HCC): ICD-10-CM

## 2021-09-08 DIAGNOSIS — N40.0 BENIGN PROSTATIC HYPERPLASIA, UNSPECIFIED WHETHER LOWER URINARY TRACT SYMPTOMS PRESENT: ICD-10-CM

## 2021-09-08 DIAGNOSIS — R32 DERMATITIS ASSOCIATED WITH INCONTINENCE: Primary | ICD-10-CM

## 2021-09-08 DIAGNOSIS — R26.2 AMBULATORY DYSFUNCTION: ICD-10-CM

## 2021-09-08 DIAGNOSIS — F02.80 LATE ONSET ALZHEIMER'S DEMENTIA WITHOUT BEHAVIORAL DISTURBANCE (HCC): ICD-10-CM

## 2021-09-08 DIAGNOSIS — F02.80 LATE ONSET ALZHEIMER'S DEMENTIA WITHOUT BEHAVIORAL DISTURBANCE (HCC): Primary | ICD-10-CM

## 2021-09-08 DIAGNOSIS — G30.1 LATE ONSET ALZHEIMER'S DEMENTIA WITHOUT BEHAVIORAL DISTURBANCE (HCC): ICD-10-CM

## 2021-09-08 DIAGNOSIS — I10 ESSENTIAL HYPERTENSION: ICD-10-CM

## 2021-09-08 DIAGNOSIS — L25.8 DERMATITIS ASSOCIATED WITH INCONTINENCE: Primary | ICD-10-CM

## 2021-09-08 DIAGNOSIS — G30.1 LATE ONSET ALZHEIMER'S DEMENTIA WITHOUT BEHAVIORAL DISTURBANCE (HCC): Primary | ICD-10-CM

## 2021-09-08 DIAGNOSIS — I25.10 CORONARY ARTERY DISEASE WITHOUT ANGINA PECTORIS, UNSPECIFIED VESSEL OR LESION TYPE, UNSPECIFIED WHETHER NATIVE OR TRANSPLANTED HEART: ICD-10-CM

## 2021-09-08 PROCEDURE — 99307 SBSQ NF CARE SF MDM 10: CPT | Performed by: NURSE PRACTITIONER

## 2021-09-08 PROCEDURE — 99309 SBSQ NF CARE MODERATE MDM 30: CPT | Performed by: NURSE PRACTITIONER

## 2021-09-08 NOTE — ASSESSMENT & PLAN NOTE
Patient denies any new or worsening symptoms  Continue Tamsulosin 0 4mg daily + Finasteride 5mg daily  Followed by Urology office as out-patient

## 2021-09-08 NOTE — PROGRESS NOTES
[address], [address] [place] [address]  [phone]    NAME: Radha Ferraro  AGE: 80 y o  SEX: male    Progress Note    Location:   POS: 32 (SNF)    Assessment/Plan:    Late onset Alzheimer dementia ([address] )  Stable  Pleasant  Ave meal completion at 50-75% per meal  Continue Centrum (for men) daily and Ensure PLUS BID    Chronic diastolic (congestive) heart failure (HCC)  Wt Readings from Last 3 Encounters:   09/01/21 76 1 kg (167 lb 12 8 oz)   03/26/19 78 9 kg (174 lb)   03/12/19 78 9 kg (174 lb)   Current weight: 76 59 kgs (9/7/2021) <= 76 22 kgs (9/1/2021)  Patient reported baseline cardiopulmonary symptoms  No BLE edema seen  BP and HR stable and controlled  Continue weekly weight checks  Continue CHF protocol  Continue Torsemide 10mg Q2 days    Essential hypertension  BP and HR stable and controlled  BP range (9/1-8/2021) = 100/60 to 155/83  ** 1 episode of SBP < 150 on this period  HR range (9/1-8/2021) = 56 to 83/min  Continue Metoprolol tartrate 50mg daily + Amlodipine 5mg daily  Continue Torsemide 10mg daily  Will continue to monitor for now  BPH (benign prostatic hyperplasia)  Patient denies any new or worsening symptoms  Continue Tamsulosin 0 4mg daily + Finasteride 5mg daily  Followed by Urology office as out-patient    Ambulatory dysfunction  Per [facility] Epic note, patient found sitting on floor  Continue 24/7 SNF supportive care and management  Continue PT/OT/ST as scheduled  Fall precaution    CAD (coronary artery disease)  Continue ASA 81mg daily + Atorvastatin 10mg daily      Chief complaint / Reason for visit: Follow-up visit    History of Present Illness: This is a 49-year-old male patient currently admitted at [facility] (8/31/2021 to present) following discharge from acute care hospitalization ([facility]) with Dx of Elevated Troponin, Fall versus Syncope, Ambulatory dysfunction      Patient is seen and examined today to follow up acute and chronic medical conditions as moving all 4 extremities symmetrically    Laboratory results / Imaging reviewed: Hard copies in medical chart: From [facility] Epic record:    * CBC with diff (2021) = WNL except:  Hb 1 (L)  RBC: 3 61 (L)  Platelet: 063 (H)  Ab  Neutro: 10 9 (H)  Ab  Lymph: 0 8 (L)    * CMP (2021) = WNL except:  Cl: 110 (H)  Alb: 2 1 (L)  TPro: 6 1 (L)  AST: 65 (H)  ALT: 61 (H)    * COVID-19 test (2021) = Not detected    * NT Pro BNP (2021) = 2,211 (H)    Current Medications: All medications reviewed and updated in Nursing Home Chart    Please note:  Voice-recognition software may have been used in the preparation of this document  Occasional wrong word or "sound-alike" substitutions may have occurred due to the inherent limitations of voice recognition software  Interpretation should be guided by context      AZALEA Alves  2021

## 2021-09-08 NOTE — ASSESSMENT & PLAN NOTE
Stable  Pleasant    Ave meal completion at 50-75% per meal  Continue Centrum (for men) daily and Ensure PLUS BID

## 2021-09-08 NOTE — ASSESSMENT & PLAN NOTE
BP and HR stable and controlled  BP range (9/1-8/2021) = 100/60 to 155/83  ** 1 episode of SBP < 150 on this period  HR range (9/1-8/2021) = 56 to 83/min  Continue Metoprolol tartrate 50mg daily + Amlodipine 5mg daily  Continue Torsemide 10mg daily  Will continue to monitor for now

## 2021-09-08 NOTE — PROGRESS NOTES
[address] MANAGEMENT   AND HYPERBARIC MEDICINE CENTER       Patient ID: Jimmie Baig is a 80 y o  male Date of Birth 12/25/1924     Location of Service: [address] [place] [address]    Performed wound round with: Wound team    Chief Complaint : sacrum wound    Wound Instructions:  Location : buttocks  Cleanse the buttocks and sacrum with soap and water  Apply hydraguad to wound bed and intact skin on buttocks and sacrum  Frequency : Twice a day and prn for soiling    Offload all wounds  Turn and reposition frequently, maximum of every two hours  Instruct/ assist patient on weight shifting when in wheelchair  Increase protein intake  Provide timely continence care for incontinent episode  Monitor for any sign of infection or worsening, inform PCP or patients primary care provider in the facility  Allergies  Patient has no known allergies  Assessment & Plan:  1  Dermatitis associated with incontinence  Assessment & Plan:  Sacrum  - healed  -continue hydraguard for protection  - sign off      2  Ambulatory dysfunction  Assessment & Plan:  -on PT      3  Late onset Alzheimer's dementia without behavioral disturbance [facility])           Subjective:   9/1/2021 This is a 80year old female referred to our service for wound on her sacrum  As pre report, the patient had the wound POA to [address] [place] [address]  Etiology : MASD  Severity : no sign of infection  Patient is incontinent of bowel  No current treatment    9/8/2021 followup for wound on the sacrum  Received patient in bed seems comfortable  No other significant issues related to the wound  Review of Systems   Unable to perform ROS: Dementia       Objective: There were no vitals taken for this visit  Physical Exam  Constitutional:       Appearance: Normal appearance  HENT:      Head: Normocephalic and atraumatic        Ears:      Comments: [place]     Nose: Nose normal       Mouth/Throat:      Mouth: Mucous membranes are moist    Eyes:      Conjunctiva/sclera: Conjunctivae normal    Cardiovascular:      Rate and Rhythm: Normal rate  Pulses: Normal pulses  Pulmonary:      Comments: With non- productive cough  Abdominal:      Tenderness: There is no abdominal tenderness  Musculoskeletal:      Cervical back: Normal range of motion  Comments: LROM   Skin:     Comments: Location Sacrum wound bed - healed   Neurological:      Mental Status: He is alert  Gait: Gait abnormal    Psychiatric:         Mood and Affect: Mood normal          Behavior: Behavior normal               Procedures           Patient's care was coordinated with nursing facility staff  Recent vitals, labs and updated medications were reviewed on EMR or chart system of facility   Past Medical, surgical, social, medication and allergy history and patient's previous records were reviewed and updated as appropriate:     Patient Active Problem List   Diagnosis    Chronic cough    Gastroesophageal reflux disease without esophagitis    CAD (coronary artery disease)    Chronic diastolic (congestive) heart failure ([facility] [address] )    Essential hypertension    Late onset Alzheimer dementia ([facility] [address] )    BPH (benign prostatic hyperplasia)    Dermatitis associated with incontinence    Ambulatory dysfunction     Past Medical History:   Diagnosis Date    Anxiety     BPH with obstruction/lower urinary tract symptoms     CAD (coronary artery disease)     Colon polyp     Glaucoma     Gross hematuria     History of thrombolytic therapy     Hypertension     Hypothyroidism     Major depressive disorder     Osteoarthritis     Paraphimosis     Peptic ulcer disease     SIRS of non-infectious origin w/o acute organ dysfunction (HCC)     Skin cancer (melanoma) (HCC)     SOB (shortness of breath)     Stroke ([facility] [address] )    [facility] Uncircumcised male      Past Surgical History:   Procedure Laterality Date    CYSTOSCOPY  05/27/2016    TRANSURETHRAL RESECTION OF PROSTATE       Social History     Socioeconomic History    Marital status:      Spouse name: Not on file    Number of children: Not on file    Years of education: Not on file    Highest education level: Not on file   Occupational History    Not on file   Tobacco Use    Smoking status: Former Smoker    Smokeless tobacco: Never Used   Substance and Sexual Activity    Alcohol use: No    Drug use: No    Sexual activity: Not on file   Other Topics Concern    Not on file   Social History Narrative    Not on file     Social Determinants of Health     Financial Resource Strain:     Difficulty of Paying Living Expenses:    Food Insecurity:     Worried About [employer] in the Last Year:     [address] [employer] in the Last Year:    Transportation Needs:     Lack of Transportation (Medical):      Lack of Transportation (Non-Medical):    Physical Activity:     Days of Exercise per Week:     Minutes of Exercise per Session:    Stress:     Feeling of Stress :    Social Connections:     Frequency of Communication with Friends and Family:     Frequency of Social Gatherings with Friends and Family:     Attends Zoroastrianism Services:     Active Member of Clubs or Organizations:     Attends Club or Organization Meetings:     Marital Status:    Intimate Partner Violence:     Fear of Current or Ex-Partner:     Emotionally Abused:     Physically Abused:     Sexually Abused:         Current Outpatient Medications:     amLODIPine (NORVASC) 5 mg tablet, Take 5 mg by mouth daily, Disp: , Rfl:     atorvastatin (LIPITOR) 10 mg tablet, Take 10 mg by mouth daily, Disp: , Rfl:     donepezil (ARICEPT) 5 mg tablet, Take 5 mg by mouth daily at bedtime, Disp: , Rfl:     finasteride (PROSCAR) 5 mg tablet, TAKE 1 TABLET (5 MG TOTAL) BY MOUTH DAILY BPH, Disp: 90 tablet, Rfl: 3    guaiFENesin (MUCINEX) 600 mg 12 hr tablet, Take 600 mg by mouth, Disp: , Rfl:     levothyroxine 50 mcg tablet, Take 50 mcg by mouth daily, Disp: , Rfl:     metoprolol succinate (TOPROL-XL) 50 mg 24 hr tablet, Take 50 mg by mouth daily, Disp: , Rfl: 1    multivitamin-iron-minerals-folic acid (CENTRUM) chewable tablet, Chew 1 tablet daily, Disp: , Rfl:     omeprazole (PriLOSEC) 20 mg delayed release capsule, Take 20 mg by mouth daily, Disp: , Rfl:     tamsulosin (FLOMAX) 0 4 mg, TAKE 1 CAPSULE BY MOUTH EVERY DAY WITH DINNER, Disp: 90 capsule, Rfl: 3    torsemide (DEMADEX) 10 mg tablet, Take 10 mg by mouth daily, Disp: , Rfl:   No family history on file  Coordination of Care:  Wound team aware of the treatment plan    Patient / Staff education :  Staff was given education on sign of infection and pressure ulcer prevention  Staff verbalized understanding     Follow up :  No follow-ups on file  Voice-recognition software may have been used in the preparation of this document  Occasional wrong word or "sound-alike" substitutions may have occurred due to the inherent limitations of voice recognition software  Interpretation should be guided by context        AZALEA Salgado

## 2021-09-08 NOTE — ASSESSMENT & PLAN NOTE
Per [facility] Epic note, patient found sitting on floor  Continue 24/7 SNF supportive care and management  Continue PT/OT/ST as scheduled  Fall precaution

## 2021-09-08 NOTE — LETTER
Patient:  Sarina Al   12/25/1924           AZALEA Jauregui saw Sarina Al for a wound care visit on 9/8/2021  See below for information relating to this visit  No chief complaint on file  Assessment/Plan:  1  Dermatitis associated with incontinence  Assessment & Plan:  Sacrum  - healed  -continue hydraguard for protection  - sign off      2  Ambulatory dysfunction  Assessment & Plan:  -on PT      3  Late onset Alzheimer's dementia without behavioral disturbance ([facility] [address] )           Orders:  Sarina Al  12/25/1924    Location : buttocks  Cleanse the buttocks and sacrum with soap and water  Apply hydraguad to wound bed and intact skin on buttocks and sacrum  Frequency : Twice a day and prn for soiling    Offload all wounds  Turn and reposition frequently, maximum of every two hours  Instruct/ assist patient on weight shifting when in wheelchair  Increase protein intake  Provide timely continence care for incontinent episode  Monitor for any sign of infection or worsening, inform PCP or patients primary care provider in the facility  Follow Up:  No follow-ups on file  PRN      [address] hours are 8:00 am - 4:30 pm Monday through Friday  The center phone number is [phone]  You can also contact me directly thru my email at [facility]  [email or web address] or thru tiger text  If it is an emergency, please contact the PCP or patient's attending physician in your facility       Sincerely,    Electronically signed by AZALEA Jauregui    Patient : Sarina Al    12/25/1924

## 2021-09-08 NOTE — ASSESSMENT & PLAN NOTE
Wt Readings from Last 3 Encounters:   09/01/21 76 1 kg (167 lb 12 8 oz)   03/26/19 78 9 kg (174 lb)   03/12/19 78 9 kg (174 lb)   Current weight: 76 59 kgs (9/7/2021) <= 76 22 kgs (9/1/2021)  Patient reported baseline cardiopulmonary symptoms  No BLE edema seen  BP and HR stable and controlled  Continue weekly weight checks  Continue CHF protocol  Continue Torsemide 10mg Q2 days

## 2021-09-15 ENCOUNTER — NURSING HOME VISIT (OUTPATIENT)
Dept: GERIATRICS | Facility: OTHER | Age: 86
End: 2021-09-15
Payer: MEDICARE

## 2021-09-15 DIAGNOSIS — I25.10 CORONARY ARTERY DISEASE WITHOUT ANGINA PECTORIS, UNSPECIFIED VESSEL OR LESION TYPE, UNSPECIFIED WHETHER NATIVE OR TRANSPLANTED HEART: ICD-10-CM

## 2021-09-15 DIAGNOSIS — R32 DERMATITIS ASSOCIATED WITH INCONTINENCE: ICD-10-CM

## 2021-09-15 DIAGNOSIS — L25.8 DERMATITIS ASSOCIATED WITH INCONTINENCE: ICD-10-CM

## 2021-09-15 DIAGNOSIS — R26.2 AMBULATORY DYSFUNCTION: ICD-10-CM

## 2021-09-15 DIAGNOSIS — G30.1 LATE ONSET ALZHEIMER'S DEMENTIA WITHOUT BEHAVIORAL DISTURBANCE (HCC): Primary | ICD-10-CM

## 2021-09-15 DIAGNOSIS — F02.80 LATE ONSET ALZHEIMER'S DEMENTIA WITHOUT BEHAVIORAL DISTURBANCE (HCC): Primary | ICD-10-CM

## 2021-09-15 DIAGNOSIS — I10 ESSENTIAL HYPERTENSION: ICD-10-CM

## 2021-09-15 DIAGNOSIS — N40.0 BENIGN PROSTATIC HYPERPLASIA, UNSPECIFIED WHETHER LOWER URINARY TRACT SYMPTOMS PRESENT: ICD-10-CM

## 2021-09-15 DIAGNOSIS — K21.9 GASTROESOPHAGEAL REFLUX DISEASE WITHOUT ESOPHAGITIS: ICD-10-CM

## 2021-09-15 DIAGNOSIS — I50.32 CHRONIC DIASTOLIC (CONGESTIVE) HEART FAILURE (HCC): ICD-10-CM

## 2021-09-15 PROCEDURE — 99309 SBSQ NF CARE MODERATE MDM 30: CPT | Performed by: NURSE PRACTITIONER

## 2021-09-15 NOTE — PROGRESS NOTES
[facility]  [address]  [phone]  Facility: [facility]  POS: 31: SNF/Short Term Rehab      NAME: Brooke Cooper  AGE: 80 y o  SEX: male  DATE OF ADMISSION: AUGUST 31, 2021    Chief complaint / Reason for visit: Follow-up visit    HPI:   Patient is seen and examined today to follow up acute and chronic medical conditions as mentioned above and Dementia, CHF, HTN, Hx of CVA, Hypothyroidism, BPH and ambulatory dysfunction  Patient is still in bed for this visit - alert, cooperative, calm, pleasant and not in distress  Patient is verbal with clear coherent speech - oriented to name/birthday and current month only  Patient reported constipation on this visit - reviewed BM log and showed daily BM with last documented bowel movement on 9/15/2021 - otherwise patient denies any acute medical concerns for this visit including pain  Per nursing patient is stable at baseline - no acute medical concerns for this  Per SW report, patient will transition to OO-LTCF management effective 09/16/2021  Today's Visit: 9/15/94218:53 PM    Subjective: " I'm alright  I feel like I have stools stuck and can't push it enough "    Vitals: T97 6F -P70 -R19  BP: 138/68 SpO2: 98% RA  Weight: 168 4 lbs (9/14/2021) <= 167 8 lbs (8/31/2021)    Review of Systems   Constitutional: Negative  HENT: Negative  Eyes: Negative  Respiratory: Negative  Cardiovascular: Negative  Gastrointestinal: Positive for constipation  Negative for abdominal distention, abdominal pain, anal bleeding, blood in stool, diarrhea, nausea, rectal pain and vomiting  Endocrine: Negative  Genitourinary: Negative  Musculoskeletal: Negative  Skin: Negative  Allergic/Immunologic: Negative  Neurological: Negative  Hematological: Negative  Psychiatric/Behavioral: Negative  All other systems reviewed and are negative  Exam: Physical Exam  Vitals and nursing note reviewed     Constitutional: General: He is not in acute distress  Appearance: Normal appearance  He is normal weight  He is not ill-appearing, toxic-appearing or diaphoretic  HENT:      Head: Normocephalic and atraumatic  Right Ear: Tympanic membrane, ear canal and external ear normal  There is no impacted cerumen  Left Ear: Tympanic membrane, ear canal and external ear normal  There is no impacted cerumen  Ears:      Comments: Hard of hearing     Nose: Nose normal  No congestion or rhinorrhea  Mouth/Throat:      Mouth: Mucous membranes are moist       Pharynx: Oropharynx is clear  No oropharyngeal exudate or posterior oropharyngeal erythema  Eyes:      General: No scleral icterus  Right eye: No discharge  Left eye: No discharge  Conjunctiva/sclera: Conjunctivae normal       Pupils: Pupils are equal, round, and reactive to light  Comments: Wears glasses  Left eye opacity with very limited vision   Neck:      Vascular: No carotid bruit  Cardiovascular:      Rate and Rhythm: Normal rate  Rhythm irregular  Heart sounds: Normal heart sounds  No murmur heard  No friction rub  No gallop  Pulmonary:      Effort: Pulmonary effort is normal  No respiratory distress  Breath sounds: Normal breath sounds  No stridor  No wheezing, rhonchi or rales  Chest:      Chest wall: No tenderness  Abdominal:      General: Bowel sounds are normal  There is no distension  Palpations: Abdomen is soft  There is no mass  Tenderness: There is no abdominal tenderness  There is no right CVA tenderness, left CVA tenderness, guarding or rebound  Hernia: No hernia is present  Genitourinary:     Comments: Non distended  Incontinent  Musculoskeletal:         General: No swelling, tenderness, deformity or signs of injury  Cervical back: Normal range of motion and neck supple  No rigidity or tenderness  Right lower leg: Edema present  Left lower leg: Edema present  Comments: Ambulatory dysfunction  BLE non pitting edema - chronic    Lymphadenopathy:      Cervical: No cervical adenopathy  Skin:     General: Skin is warm  Capillary Refill: Capillary refill takes less than 2 seconds  Coloration: Skin is not jaundiced or pale  Findings: No bruising, erythema, lesion or rash  Comments: Small dry resolving abrasion to Right knee   Neurological:      General: No focal deficit present  Mental Status: He is alert  Mental status is at baseline  Comments: Verbal with clear coherent speech - oriented to name/birthday and current month only   Psychiatric:         Mood and Affect: Mood normal          Behavior: Behavior normal       Comments: Alert, cooperative, calm, pleasant and not in distress  Assessment and Plan:    Late onset Alzheimer dementia ([facility] [address] )  Stable  Pleasant  Improved meal completion to 75% - 100 per meal  Continue Centrum (for men) daily and Ensure PLUS BID    Chronic diastolic (congestive) heart failure (HCC)      Wt Readings from Last 3 Encounters:   09/01/21 76 1 kg (167 lb 12 8 oz)   03/26/19 78 9 kg (174 lb)   03/12/19 78 9 kg (174 lb)   Weight stable  Patient reported baseline cardiopulmonary symptoms  Current weight: 76 54 kgs (9/14/2021) <= 76 59 kgs (9/7/2021) <= 76 22 kgs (9/1/2021)  Noted mild swelling to BLE edema - non pitting nor dermatitis/cellulitis  BP and HR stable and controlled  Continue CHF protocol  Continue Torsemide 10mg Q2 days  Check CBC w/o diff, BMP and NT Pro BNP on 9/21/2021    Essential hypertension  BP and HR stable and controlled    BP range (9/1-15/2021) = 100/60 to 155/83  ** 1 episode of SBP < 150 on this period  HR range (9/1-15/2021) = 56 to 92/min  Continue Metoprolol tartrate 50mg daily + Amlodipine 5mg daily  Continue Torsemide 10mg daily    BPH (benign prostatic hyperplasia)  Patient denies any new or worsening symptoms  Continue Tamsulosin 0 4mg daily + Finasteride 5mg daily    Ambulatory dysfunction  Per [facility] note, patient found sitting on floor  Patient will transition to OO-LTCF management on 2021  Fall precaution    CAD (coronary artery disease)  Continue ASA 81mg daily + Atorvastatin 10mg daily    GERD  Asymptomatic  Continue Omeprazole DR 20mg daily    Dermatitis associated with incontinence  Resolved  Current Medications: All medications reviewed and updated in [address] [place] [address] and testing performed during stay: Lab results below while in-patient ([facility]) [facility] record  No new lab results to review at this time  * CBC with diff (2021) = WNL except:  Hb 1 (L)  RBC: 3 61 (L)  Platelet: 780 (H)  Ab  Neutro: 10 9 (H)  Ab   Lymph: 0 8 (L)     * CMP (2021) = WNL except:  Cl: 110 (H)  Alb: 2 1 (L)  TPro: 6 1 (L)  AST: 65 (H)  ALT: 61 (H)     * COVID-19 test (2021) = Not detected     * NT Pro BNP (2021) = 2,211 (H)    AZALEA Harley  19:28 PM

## 2021-09-21 ENCOUNTER — NURSING HOME VISIT (OUTPATIENT)
Dept: GERIATRICS | Facility: OTHER | Age: 86
End: 2021-09-21
Payer: MEDICARE

## 2021-09-21 DIAGNOSIS — L03.115 BILATERAL LOWER LEG CELLULITIS: ICD-10-CM

## 2021-09-21 DIAGNOSIS — L03.116 BILATERAL LOWER LEG CELLULITIS: ICD-10-CM

## 2021-09-21 DIAGNOSIS — I50.32 CHRONIC DIASTOLIC (CONGESTIVE) HEART FAILURE (HCC): Primary | ICD-10-CM

## 2021-09-21 PROCEDURE — 99308 SBSQ NF CARE LOW MDM 20: CPT | Performed by: NURSE PRACTITIONER

## 2021-09-21 NOTE — PROGRESS NOTES
[address]    Ger[address] Man[address] 79  [phone]  Jud  Code 32  ACUTE VISIT    Assessment/Plan:    1  Chronic diastolic (congestive) heart failure (HCC)  Assessment & Plan:  Wt Readings from Last 3 Encounters:   09/01/21 76 1 kg (167 lb 12 8 oz)   03/26/19 78 9 kg (174 lb)   03/12/19 78 9 kg (174 lb)       · Current weight 168 4- stable  · +3 pitting edema with b/l LE erythema   · Increase Torsemide to 20 mg Every other day x 4 days ( til 9/24/21) then return back to 10 mg every other day on 9/25/21  · Follow up with labs ordered for 9/22/21  · Repeat BMP on 9/27/21 to monitor Kidney function   · Last Creat stable 0 70  · Continue MUSHTAQ diet  · Start Daily Weights       2  Bilateral lower leg cellulitis  Assessment & Plan:  · Bright red erythema to B/L LE  · Denies pain  · +3 pitting edema to BLE  · Mild serosanguinous drainage present to LLE on sock   · Denies fever,chills  · Not on anticoagulation however not likely DVT as this presents to bilateral extremities without pain  · Obtain CBCD, BMP and NT pro BNP on 9/22/21  · Start Amoxicillin 500 mg TID x 5 days- Give with food  · Elevate BLE when OOB  · Likely source is fluid overload- See CHF A/P           Subjective:      Patient ID: Brooke Cooper is a 80 y o  male  Patient has a  past medical history including but not limited to late onset Alzheimer dementia, CHF, HTN, GERD, BPH, ambulatory dysfunction seen in collaboration with nursing for an acute visit  Notified by nursing that patient has bilateral lower extremity extreme redness and warmth and is concern for cellulitis  Patient is a poor historian due to Alzheimer's however he is able to provide a limited history  Patient is alert and oriented to self only  He was able to remember his age  Upon exam patient has bilateral lower extremities left greater than right  bright red erythema with underlying healing venous stasis ulcers    +3 pitting edema to bilateral lower extremities  Patient denies pain to his legs  Denies fever chills  Denies chest pain/shortness of breath/N/V/D  Review of Systems   Constitutional: Negative for activity change, appetite change, chills, fatigue and fever  HENT: Negative for ear pain, rhinorrhea and trouble swallowing  Eyes: Negative for pain and visual disturbance  Respiratory: Negative for cough, shortness of breath and wheezing  Cardiovascular: Positive for leg swelling  Negative for chest pain and palpitations  Gastrointestinal: Negative for abdominal distention, abdominal pain, blood in stool, constipation, diarrhea, nausea and vomiting  Genitourinary: Positive for frequency (Not new per patient with history of BPH on torsemide)  Negative for decreased urine volume, difficulty urinating, dysuria and hematuria  Musculoskeletal: Positive for gait problem  Negative for arthralgias and back pain  Skin: Negative for color change and rash  Neurological: Positive for weakness  Negative for dizziness, syncope, light-headedness, numbness and headaches  Psychiatric/Behavioral: Negative for dysphoric mood and sleep disturbance  Objective:    VITALS:  Temp 97 1° pulse 91 respirations 18 /65 SpO2 95% room air weight 168 4 lb     Physical Exam  Vitals and nursing note reviewed  Constitutional:       General: He is not in acute distress  Appearance: Normal appearance  Comments: Elderly male sitting in chair at [address] [place] [address] Street:      Head: Normocephalic and atraumatic  Nose: No congestion or rhinorrhea  Mouth/Throat:      Mouth: Mucous membranes are moist    Eyes:      General: No scleral icterus  Extraocular Movements: Extraocular movements intact  Conjunctiva/sclera: Conjunctivae normal       Pupils: Pupils are equal, round, and reactive to light  Cardiovascular:      Rate and Rhythm: Normal rate  Rhythm irregular  Pulses: Normal pulses        Heart sounds: Normal heart sounds  No murmur heard  Pulmonary:      Effort: Pulmonary effort is normal       Breath sounds: Normal breath sounds  No wheezing, rhonchi or rales  Abdominal:      General: Bowel sounds are normal  There is no distension  Palpations: Abdomen is soft  Tenderness: There is no abdominal tenderness  Musculoskeletal:         General: No swelling or signs of injury  Right lower leg: Edema (+3 pitting) present  Left lower leg: Edema ( +3 pitting) present  Lymphadenopathy:      Cervical: No cervical adenopathy  Skin:     General: Skin is warm and dry  Findings: Erythema ( diffuse erythema to bilateral lower extremities) present  Neurological:      Mental Status: He is alert  Mental status is at baseline  He is disoriented  Sensory: No sensory deficit  Motor: Weakness present  Gait: Gait abnormal       Comments: Clear coherent speech alert and oriented to person only at this time   Psychiatric:         Mood and Affect: Mood normal          Behavior: Behavior normal            Plan discussed with Dr Katia Lindsay noted agreement with assessment and plan  Please note:  Voice-recognition software may have been used in the preparation of this document  Occasional wrong word or "sound-alike" substitutions may have occurred due to the inherent limitations of voice recognition software  Interpretation should be guided by context         AZALEA Akbar  09/21/21  12:00 PM

## 2021-09-22 PROBLEM — L03.115 BILATERAL LOWER LEG CELLULITIS: Status: ACTIVE | Noted: 2021-09-22

## 2021-09-22 PROBLEM — L03.116 BILATERAL LOWER LEG CELLULITIS: Status: ACTIVE | Noted: 2021-09-22

## 2021-09-22 NOTE — ASSESSMENT & PLAN NOTE
· Bright red erythema to B/L LE  · Denies pain  · +3 pitting edema to BLE  · Mild serosanguinous drainage present to LLE on sock   · Denies fever,chills  · Not on anticoagulation however not likely DVT as this presents to bilateral extremities without pain  · Obtain CBCD, BMP and NT pro BNP on 9/22/21  · Start Amoxicillin 500 mg TID x 5 days- Give with food  · Elevate BLE when OOB  · Likely source is fluid overload- See CHF A/P

## 2021-09-22 NOTE — ASSESSMENT & PLAN NOTE
Wt Readings from Last 3 Encounters:   09/01/21 76 1 kg (167 lb 12 8 oz)   03/26/19 78 9 kg (174 lb)   03/12/19 78 9 kg (174 lb)       · Current weight 168 4- stable  · +3 pitting edema with b/l LE erythema   · Increase Torsemide to 20 mg Every other day x 4 days ( til 9/24/21) then return back to 10 mg every other day on 9/25/21  · Follow up with labs ordered for 9/22/21  · Repeat BMP on 9/27/21 to monitor Kidney function   · Last Creat stable 0 70  · Continue MUSHTAQ diet  · Start Daily Weights

## 2021-09-24 ENCOUNTER — TELEPHONE (OUTPATIENT)
Dept: OTHER | Facility: OTHER | Age: 86
End: 2021-09-24

## 2021-09-24 NOTE — TELEPHONE ENCOUNTER
Patient fell about 5 minutes  He hit the back of his head  He is responding appropriately  Patients right pupil is completely dilated  Pt does not remember how he fell  Staff does not remember ever seeing his pupil dilated like that

## 2021-09-29 ENCOUNTER — NURSING HOME VISIT (OUTPATIENT)
Dept: WOUND CARE | Facility: HOSPITAL | Age: 86
End: 2021-09-29
Payer: MEDICARE

## 2021-09-29 DIAGNOSIS — F02.80 LATE ONSET ALZHEIMER'S DEMENTIA WITHOUT BEHAVIORAL DISTURBANCE (HCC): ICD-10-CM

## 2021-09-29 DIAGNOSIS — R32 DERMATITIS ASSOCIATED WITH INCONTINENCE: Primary | ICD-10-CM

## 2021-09-29 DIAGNOSIS — G30.1 LATE ONSET ALZHEIMER'S DEMENTIA WITHOUT BEHAVIORAL DISTURBANCE (HCC): ICD-10-CM

## 2021-09-29 DIAGNOSIS — R26.2 AMBULATORY DYSFUNCTION: ICD-10-CM

## 2021-09-29 DIAGNOSIS — L25.8 DERMATITIS ASSOCIATED WITH INCONTINENCE: Primary | ICD-10-CM

## 2021-09-29 PROCEDURE — 99309 SBSQ NF CARE MODERATE MDM 30: CPT | Performed by: NURSE PRACTITIONER

## 2021-09-29 NOTE — ASSESSMENT & PLAN NOTE
Left buttock  - 100% granulation  - local wound care with medihoney  - 0ffload  - follow up next week

## 2021-09-29 NOTE — PATIENT INSTRUCTIONS
Orders Placed This Encounter   Procedures    Wound cleansing and dressings     Wound:  Left buttock  Discontinue previous wound order  Cleanse the sacrum with soap and water, pat dry  Apply medihoney gel  to wound bed and cover with bordered foam  Frequency : daily and prn for soiling  Offload all wounds  Turn and reposition frequently, maximum of every two hours  Instruct / Assist with weight shifting when in chair  Increase protein intake  Monitor for any sign of infection or worsening, inform PCP or patient's primary physician in your facility       Standing Status:   Future     Standing Expiration Date:   9/29/2022

## 2021-09-29 NOTE — LETTER
Patient:  Tara Tan   12/25/1924           AZALEA Mcgregor saw Tara Tan for a wound care visit on 9/29/2021  See below for information relating to this visit  Chief Complaint   Patient presents with    Follow Up Wound Care Visit     Left buttock        Assessment/Plan:  1  Dermatitis associated with incontinence  Assessment & Plan:  Left buttock  - 100% granulation  - local wound care with medihoney  - 0ffload  - follow up next week    Orders:  -     Wound cleansing and dressings; Future    2  Late onset Alzheimer's dementia without behavioral disturbance ([facility] [address] )  Assessment & Plan:  Stable      3  Ambulatory dysfunction  Assessment & Plan:  - on PT             Orders:  Tara Tan  12/25/1924  Orders Placed This Encounter   Procedures    Wound cleansing and dressings     Wound:  Left buttock  Discontinue previous wound order  Cleanse the sacrum with soap and water, pat dry  Apply medihoney gel  to wound bed and cover with bordered foam  Frequency : daily and prn for soiling  Offload all wounds  Turn and reposition frequently, maximum of every two hours  Instruct / Assist with weight shifting when in chair  Increase protein intake  Monitor for any sign of infection or worsening, inform PCP or patient's primary physician in your facility  Standing Status:   Future     Standing Expiration Date:   9/29/2022         Follow Up:  Return in about 1 week (around 10/6/2021)  [facility] hours are 8:00 am - 4:30 pm Monday through Friday  The center phone number is [phone]  You can also contact me directly thru my email at [facility]  [email or web address] or thru tiger text  If it is an emergency, please contact the PCP or patient's attending physician in your facility       Sincerely,    Electronically signed by AZALEA Mcgregor    Patient : Tara Tan    12/25/1924

## 2021-09-30 NOTE — PROGRESS NOTES
[address] MANAGEMENT   AND HYPERBARIC MEDICINE CENTER       Patient ID: Bobby Ernst is a 80 y o  male Date of Birth 12/25/1924     Location of Service: [address] [place] [address]    Performed wound round with: Wound team    Chief Complaint :  Buttocks    Wound Instructions:  Wound:  Left buttock   Discontinue previous wound order   Cleanse the sacrum with soap and water, pat dry   Apply medihoney gel  to wound bed and cover with bordered foam   Frequency : daily and prn for soiling   Offload all wounds   Turn and reposition frequently, maximum of every two hours   Instruct / Assist with weight shifting when in chair   Increase protein intake  Monitor for any sign of infection or worsening, inform PCP or patient's primary physician in your facility  Allergies  Patient has no known allergies  Assessment & Plan:  1  Dermatitis associated with incontinence  Assessment & Plan:  Left buttock  - 100% granulation  - local wound care with medihoney  - 0ffload  - follow up next week    Orders:  -     Wound cleansing and dressings; Future    2  Late onset Alzheimer's dementia without behavioral disturbance ([facility] [address] )  Assessment & Plan:  Stable      3  Ambulatory dysfunction  Assessment & Plan:  - on PT             Subjective:   9/1/2021 This is a 80year old female referred to our service for wound on her sacrum  As pre report, the patient had the wound POA to [address] [place] [address]  Etiology : MASD  Severity : no sign of infection  Patient is incontinent of bowel  No current treatment    9/8/2021 followup for wound on the sacrum  Received patient in bed seems comfortable  No other significant issues related to the wound  9/29/2021  This is a  new consult for wound on the left buttock  As per report, the wound started last week  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  Denies pain  Patient is incontinent of bowel and bladder  Review of Systems   Unable to perform ROS: Dementia       Objective:   There were no vitals taken for this visit  Physical Exam  Constitutional:       Appearance: Normal appearance  HENT:      Head: Normocephalic and atraumatic  Ears:      Comments: [place]     Nose: Nose normal       Mouth/Throat:      Mouth: Mucous membranes are moist    Eyes:      Conjunctiva/sclera: Conjunctivae normal    Cardiovascular:      Pulses: Normal pulses  Pulmonary:      Comments: With non- productive cough  Abdominal:      Tenderness: There is no abdominal tenderness  Musculoskeletal:      Cervical back: Normal range of motion  Comments: LROM   Skin:     Comments: Wound # 1   Location and Type: Right Buttock, MASD   Current measurements: Resolved   Wound #2   Location and Type: Left Buttock, MASD   Current measurements: 3cm x 3 5cm x 0 1cm   Wound bed: 100% granulation, erin wound macerated   Drainage: scant serous   Odor: none   S/S of infection: none   Undermining/tunneling: none   Treatment: Medihoney, border foam   Neurological:      Mental Status: He is alert  Gait: Gait abnormal    Psychiatric:         Mood and Affect: Mood normal          Behavior: Behavior normal               Procedures           Patient's care was coordinated with nursing facility staff  Recent vitals, labs and updated medications were reviewed on EMR or chart system of facility   Past Medical, surgical, social, medication and allergy history and patient's previous records were reviewed and updated as appropriate:     Patient Active Problem List   Diagnosis    Chronic cough    Gastroesophageal reflux disease without esophagitis    CAD (coronary artery disease)    Chronic diastolic (congestive) heart failure ([facility] [address] )    Essential hypertension    Late onset Alzheimer dementia ([facility] [address] )    BPH (benign prostatic hyperplasia)    Dermatitis associated with incontinence    Ambulatory dysfunction    Bilateral lower leg cellulitis     Past Medical History:   Diagnosis Date    Anxiety     BPH with obstruction/lower urinary tract symptoms     CAD (coronary artery disease)     Colon polyp     Glaucoma     Gross hematuria     History of thrombolytic therapy     Hypertension     Hypothyroidism     Major depressive disorder     Osteoarthritis     Paraphimosis     Peptic ulcer disease     SIRS of non-infectious origin w/o acute organ dysfunction (HCC)     Skin cancer (melanoma) (HCC)     SOB (shortness of breath)     Stroke ([facility] [address] )    Vena Carol Uncircumcised male      Past Surgical History:   Procedure Laterality Date    CYSTOSCOPY  05/27/2016    TRANSURETHRAL RESECTION OF PROSTATE       Social History     Socioeconomic History    Marital status:      Spouse name: None    Number of children: None    Years of education: None    Highest education level: None   Occupational History    None   Tobacco Use    Smoking status: Former Smoker    Smokeless tobacco: Never Used   Substance and Sexual Activity    Alcohol use: No    Drug use: No    Sexual activity: None   Other Topics Concern    None   Social History Narrative    None     Social Determinants of Health     Financial Resource Strain:     Difficulty of Paying Living Expenses:    Food Insecurity:     Worried About Running Out of Food in the Last Year:     Ran Out of Food in the Last Year:    Transportation Needs:     Lack of Transportation (Medical):      Lack of Transportation (Non-Medical):    Physical Activity:     Days of Exercise per Week:     Minutes of Exercise per Session:    Stress:     Feeling of Stress :    Social Connections:     Frequency of Communication with Friends and Family:     Frequency of Social Gatherings with Friends and Family:     Attends Spiritism Services:     Active Member of Clubs or Organizations:     Attends Club or Organization Meetings:     Marital Status:    Intimate Partner Violence:     Fear of Current or Ex-Partner:     Emotionally Abused:     Physically Abused:     Sexually Abused:         Current Outpatient Medications:     amLODIPine (NORVASC) 5 mg tablet, Take 5 mg by mouth daily, Disp: , Rfl:     atorvastatin (LIPITOR) 10 mg tablet, Take 10 mg by mouth daily, Disp: , Rfl:     donepezil (ARICEPT) 5 mg tablet, Take 5 mg by mouth daily at bedtime, Disp: , Rfl:     finasteride (PROSCAR) 5 mg tablet, TAKE 1 TABLET (5 MG TOTAL) BY MOUTH DAILY BPH, Disp: 90 tablet, Rfl: 3    guaiFENesin (MUCINEX) 600 mg 12 hr tablet, Take 600 mg by mouth, Disp: , Rfl:     levothyroxine 50 mcg tablet, Take 50 mcg by mouth daily, Disp: , Rfl:     metoprolol succinate (TOPROL-XL) 50 mg 24 hr tablet, Take 50 mg by mouth daily, Disp: , Rfl: 1    multivitamin-iron-minerals-folic acid (CENTRUM) chewable tablet, Chew 1 tablet daily, Disp: , Rfl:     omeprazole (PriLOSEC) 20 mg delayed release capsule, Take 20 mg by mouth daily, Disp: , Rfl:     tamsulosin (FLOMAX) 0 4 mg, TAKE 1 CAPSULE BY MOUTH EVERY DAY WITH DINNER, Disp: 90 capsule, Rfl: 3    torsemide (DEMADEX) 10 mg tablet, Take 10 mg by mouth every other day, Disp: , Rfl:   History reviewed  No pertinent family history  Coordination of Care:  Wound team aware of the treatment plan    Patient / Staff education :  Staff was given education on sign of infection and pressure ulcer prevention  Staff verbalized understanding     Follow up :  Return in about 1 week (around 10/6/2021)  Voice-recognition software may have been used in the preparation of this document  Occasional wrong word or "sound-alike" substitutions may have occurred due to the inherent limitations of voice recognition software  Interpretation should be guided by context        AZALEA Lee

## 2021-10-06 ENCOUNTER — NURSING HOME VISIT (OUTPATIENT)
Dept: WOUND CARE | Facility: HOSPITAL | Age: 86
End: 2021-10-06
Payer: MEDICARE

## 2021-10-06 DIAGNOSIS — R32 DERMATITIS ASSOCIATED WITH INCONTINENCE: Primary | ICD-10-CM

## 2021-10-06 DIAGNOSIS — R26.2 AMBULATORY DYSFUNCTION: ICD-10-CM

## 2021-10-06 DIAGNOSIS — F02.80 LATE ONSET ALZHEIMER'S DEMENTIA WITHOUT BEHAVIORAL DISTURBANCE (HCC): ICD-10-CM

## 2021-10-06 DIAGNOSIS — G30.1 LATE ONSET ALZHEIMER'S DEMENTIA WITHOUT BEHAVIORAL DISTURBANCE (HCC): ICD-10-CM

## 2021-10-06 DIAGNOSIS — L25.8 DERMATITIS ASSOCIATED WITH INCONTINENCE: Primary | ICD-10-CM

## 2021-10-06 DIAGNOSIS — L82.1 SEBORRHEIC KERATOSIS: ICD-10-CM

## 2021-10-06 PROCEDURE — 99308 SBSQ NF CARE LOW MDM 20: CPT | Performed by: NURSE PRACTITIONER

## 2021-10-07 PROBLEM — L82.1 SEBORRHEIC KERATOSIS: Status: ACTIVE | Noted: 2021-10-07

## 2021-11-05 ENCOUNTER — NURSING HOME VISIT (OUTPATIENT)
Dept: GERIATRICS | Facility: OTHER | Age: 86
End: 2021-11-05
Payer: MEDICARE

## 2021-11-05 DIAGNOSIS — I25.10 CORONARY ARTERY DISEASE WITHOUT ANGINA PECTORIS, UNSPECIFIED VESSEL OR LESION TYPE, UNSPECIFIED WHETHER NATIVE OR TRANSPLANTED HEART: ICD-10-CM

## 2021-11-05 DIAGNOSIS — G30.1 LATE ONSET ALZHEIMER'S DEMENTIA WITHOUT BEHAVIORAL DISTURBANCE (HCC): ICD-10-CM

## 2021-11-05 DIAGNOSIS — I50.32 CHRONIC DIASTOLIC (CONGESTIVE) HEART FAILURE (HCC): ICD-10-CM

## 2021-11-05 DIAGNOSIS — I10 ESSENTIAL HYPERTENSION: Primary | ICD-10-CM

## 2021-11-05 DIAGNOSIS — F02.80 LATE ONSET ALZHEIMER'S DEMENTIA WITHOUT BEHAVIORAL DISTURBANCE (HCC): ICD-10-CM

## 2021-11-05 PROCEDURE — 99309 SBSQ NF CARE MODERATE MDM 30: CPT | Performed by: NURSE PRACTITIONER

## 2021-11-07 PROBLEM — L03.115 BILATERAL LOWER LEG CELLULITIS: Status: RESOLVED | Noted: 2021-09-22 | Resolved: 2021-11-07

## 2021-11-07 PROBLEM — L03.116 BILATERAL LOWER LEG CELLULITIS: Status: RESOLVED | Noted: 2021-09-22 | Resolved: 2021-11-07

## 2022-02-08 ENCOUNTER — NURSING HOME VISIT (OUTPATIENT)
Dept: GERIATRICS | Facility: OTHER | Age: 87
End: 2022-02-08
Payer: COMMERCIAL

## 2022-02-08 VITALS
WEIGHT: 182 LBS | BODY MASS INDEX: 30.29 KG/M2 | SYSTOLIC BLOOD PRESSURE: 157 MMHG | TEMPERATURE: 97.1 F | OXYGEN SATURATION: 96 % | DIASTOLIC BLOOD PRESSURE: 62 MMHG | RESPIRATION RATE: 18 BRPM | HEART RATE: 79 BPM

## 2022-02-08 DIAGNOSIS — I10 ESSENTIAL HYPERTENSION: ICD-10-CM

## 2022-02-08 DIAGNOSIS — K21.9 GASTROESOPHAGEAL REFLUX DISEASE WITHOUT ESOPHAGITIS: ICD-10-CM

## 2022-02-08 DIAGNOSIS — G30.1 LATE ONSET ALZHEIMER'S DEMENTIA WITHOUT BEHAVIORAL DISTURBANCE (HCC): ICD-10-CM

## 2022-02-08 DIAGNOSIS — R05.3 CHRONIC COUGH: ICD-10-CM

## 2022-02-08 DIAGNOSIS — I25.10 CORONARY ARTERY DISEASE WITHOUT ANGINA PECTORIS, UNSPECIFIED VESSEL OR LESION TYPE, UNSPECIFIED WHETHER NATIVE OR TRANSPLANTED HEART: Primary | ICD-10-CM

## 2022-02-08 DIAGNOSIS — R26.2 AMBULATORY DYSFUNCTION: ICD-10-CM

## 2022-02-08 DIAGNOSIS — F02.80 LATE ONSET ALZHEIMER'S DEMENTIA WITHOUT BEHAVIORAL DISTURBANCE (HCC): ICD-10-CM

## 2022-02-08 DIAGNOSIS — I50.32 CHRONIC DIASTOLIC (CONGESTIVE) HEART FAILURE (HCC): ICD-10-CM

## 2022-02-08 PROCEDURE — 99309 SBSQ NF CARE MODERATE MDM 30: CPT | Performed by: INTERNAL MEDICINE

## 2022-02-08 NOTE — PROGRESS NOTES
[address]  [address], Rhys Chou U  49     Progress Note  Code [facility] 32    Patient Location     [facility]    Reason for visit     F/U dementia, HTN, HLD, BPH, hypothyroidism    Patients care was coordinated with nursing facility staff  Recent vitals, labs and updated medications were reviewed on [facility]  Problem List Items Addressed This Visit        Digestive    Gastroesophageal reflux disease without esophagitis     Stable on omeprazole 20 mg daily            Cardiovascular and Mediastinum    CAD (coronary artery disease) - Primary     Stable  No angina symptoms  Continue aspirin 81 mg and metoprolol 50 mg b i d  daily  Considering advanced age will discontinue statin         Chronic diastolic (congestive) heart failure (HCC)     Wt Readings from Last 3 Encounters:   09/01/21 76 1 kg (167 lb 12 8 oz)   03/26/19 78 9 kg (174 lb)   03/12/19 78 9 kg (174 lb)       Clinically euvolemic  Patient remains on torsemide 10 mg daily   Ptient has had weight gain of 14 lbs since September 21 however weight has been stable during last 3 months  Essential hypertension     Blood pressure stable on metoprolol 50 mg b i d  and amlodipine 5 mg daily            Nervous and Auditory    Late onset Alzheimer dementia ([facility] [address] )     Behaviors have been stable  No agitation or irritability  Continue supportive treatment  Maintain adequate sleep-wake cycle  Maintain adequate hydration            Other    Chronic cough     Currently stable  Change guaifenesin to 200 mg b i d  p r n  Feliberto Ingles Ambulatory dysfunction     Continue to implement fall precautions             Hyperlipidemia:  Patient has been on low-dose of Lipitor 10 mg daily  Considering advanced age will discontinue    Hypothyroidism:  Continue levothyroxine 50 mcg daily  Check TSH    HPI       Patient is being seen for follow-up visit today    He is doing okay at present  Awake alert able to make his needs known  Patient denies any active complaints  No recent falls  No behavioral issues  Patient appears to be pleasantly confused  Review of Systems   Constitutional: Negative for chills, fatigue and fever  HENT: Negative for nosebleeds and rhinorrhea  Eyes: Negative for discharge and redness  Respiratory: Negative for cough, shortness of breath, wheezing and stridor  Cardiovascular: Negative for chest pain and leg swelling  Gastrointestinal: Negative for abdominal distention, abdominal pain, diarrhea and vomiting  Genitourinary: Negative for hematuria  Musculoskeletal: Positive for gait problem  Negative for arthralgias and back pain  Skin: Negative for pallor  Neurological: Negative for tremors, seizures and syncope  Weakness: Generalized  Psychiatric/Behavioral: Positive for confusion  Negative for agitation and behavioral problems         Past Medical History:   Diagnosis Date    Anxiety     Bilateral lower leg cellulitis 9/22/2021    BPH with obstruction/lower urinary tract symptoms     CAD (coronary artery disease)     Colon polyp     Glaucoma     Gross hematuria     History of thrombolytic therapy     Hypertension     Hypothyroidism     Major depressive disorder     Osteoarthritis     Paraphimosis     Peptic ulcer disease     SIRS of non-infectious origin w/o acute organ dysfunction (HCC)     Skin cancer (melanoma) (HCC)     SOB (shortness of breath)     Stroke ([facility] [address] )    Roxanna Nine Uncircumcised male        Past Surgical History:   Procedure Laterality Date    CYSTOSCOPY  05/27/2016    TRANSURETHRAL RESECTION OF PROSTATE         Social History     Tobacco Use   Smoking Status Former Smoker   Smokeless Tobacco Never Used       No Known Allergies      Current Outpatient Medications:     amLODIPine (NORVASC) 5 mg tablet, Take 5 mg by mouth daily, Disp: , Rfl:     finasteride (PROSCAR) 5 mg tablet, TAKE 1 TABLET (5 MG TOTAL) BY MOUTH DAILY BPH, Disp: 90 tablet, Rfl: 3    guaiFENesin (MUCINEX) 600 mg 12 hr tablet, Take 600 mg by mouth, Disp: , Rfl:     levothyroxine 50 mcg tablet, Take 50 mcg by mouth daily, Disp: , Rfl:     metoprolol succinate (TOPROL-XL) 50 mg 24 hr tablet, Take 50 mg by mouth daily, Disp: , Rfl: 1    multivitamin-iron-minerals-folic acid (CENTRUM) chewable tablet, Chew 1 tablet daily, Disp: , Rfl:     omeprazole (PriLOSEC) 20 mg delayed release capsule, Take 20 mg by mouth daily, Disp: , Rfl:     tamsulosin (FLOMAX) 0 4 mg, TAKE 1 CAPSULE BY MOUTH EVERY DAY WITH DINNER, Disp: 90 capsule, Rfl: 3    torsemide (DEMADEX) 10 mg tablet, Take 10 mg by mouth every other day, Disp: , Rfl:     Updated list was reviewed in [facility] system of facility  There were no vitals filed for this visit  Physical Exam  Constitutional:       General: He is not in acute distress  Appearance: He is well-developed  He is not diaphoretic  HENT:      Head: Normocephalic and atraumatic  Nose: No rhinorrhea  Eyes:      General: No scleral icterus  Right eye: No discharge  Left eye: No discharge  Cardiovascular:      Rate and Rhythm: Normal rate and regular rhythm  Pulmonary:      Breath sounds: No stridor  No wheezing  Abdominal:      General: There is no distension  Palpations: Abdomen is soft  Tenderness: There is no abdominal tenderness  There is no guarding  Musculoskeletal:      Cervical back: Neck supple  Right lower leg: No edema  Left lower leg: No edema  Skin:     Coloration: Skin is not jaundiced or pale  Neurological:      General: No focal deficit present  Mental Status: He is alert  Cranial Nerves: No cranial nerve deficit  Comments: Not oriented in month and year   Psychiatric:         Mood and Affect: Mood normal          Behavior: Behavior normal          Diagnostic Data:    Recent labs were reviewed    Labs done on 09/27/2021 revealed BUN of 16, creatinine 0 51, sodium 141, potassium 4, calcium 8 2    Additional Notes:   Discontinue atorvastatin  Check CBC, CMP and TSH  This note was electronically signed by Dr Charmayne Obey

## 2022-02-08 NOTE — ASSESSMENT & PLAN NOTE
Stable  No angina symptoms  Continue aspirin 81 mg and metoprolol 50 mg b i d  daily    Considering advanced age will discontinue statin

## 2022-02-08 NOTE — ASSESSMENT & PLAN NOTE
Behaviors have been stable  No agitation or irritability  Continue supportive treatment  Maintain adequate sleep-wake cycle    Maintain adequate hydration

## 2022-02-08 NOTE — ASSESSMENT & PLAN NOTE
Wt Readings from Last 3 Encounters:   09/01/21 76 1 kg (167 lb 12 8 oz)   03/26/19 78 9 kg (174 lb)   03/12/19 78 9 kg (174 lb)       Clinically euvolemic  Patient remains on torsemide 10 mg daily   Ptient has had weight gain of 14 lbs since September 21 however weight has been stable during last 3 months

## 2022-02-09 ENCOUNTER — NURSING HOME VISIT (OUTPATIENT)
Dept: GERIATRICS | Facility: OTHER | Age: 87
End: 2022-02-09
Payer: COMMERCIAL

## 2022-02-09 VITALS
HEART RATE: 68 BPM | RESPIRATION RATE: 18 BRPM | TEMPERATURE: 97.2 F | SYSTOLIC BLOOD PRESSURE: 140 MMHG | OXYGEN SATURATION: 96 % | BODY MASS INDEX: 30.29 KG/M2 | DIASTOLIC BLOOD PRESSURE: 48 MMHG | WEIGHT: 182 LBS

## 2022-02-09 DIAGNOSIS — L82.1 SEBORRHEIC KERATOSIS: Primary | ICD-10-CM

## 2022-02-09 PROCEDURE — 99307 SBSQ NF CARE SF MDM 10: CPT | Performed by: NURSE PRACTITIONER

## 2022-02-10 NOTE — PROGRESS NOTES
[facility]  [address]  [phone]  [place]  Code 28  ACUTE VISIT    Assessment/Plan:    1  Seborrheic keratosis  Assessment & Plan:  · Noted in October of 2021  · Seen by [address] -treated with Hydrocortisone and recommendation to f/u with Dermatology  · Notified by nursing that lesion has grown since then and appears worse per nursing   · Upon exam -Left Lumbar area with raised lesion/tumor- it is Red,moist and irregular in shape and measures approx 3x3cm  · Patient denies pain and is unaware of the lesion/tumor   · Suspicious for melanoma  · Recommend Dermatology consult for biopsy  · Patient agreeable            Subjective:      Patient ID: Robert Decker is a 80 y o  male  Patient is a STR patient/ LTC resident at [facility]  Seen and evaluated at bedside today for Acute visit per request of nursing for worsening growth on lower back  PAST MEDICAL HISTORY:  Past Medical History:   Diagnosis Date    Anxiety     Bilateral lower leg cellulitis 9/22/2021    BPH with obstruction/lower urinary tract symptoms     CAD (coronary artery disease)     Colon polyp     Glaucoma     Gross hematuria     History of thrombolytic therapy     Hypertension     Hypothyroidism     Major depressive disorder     Osteoarthritis     Paraphimosis     Peptic ulcer disease     SIRS of non-infectious origin w/o acute organ dysfunction (HCC)     Skin cancer (melanoma) (HCC)     SOB (shortness of breath)     Stroke ([address] )     Uncircumcised male      Past Surgical History:   Procedure Laterality Date    CYSTOSCOPY  05/27/2016    TRANSURETHRAL RESECTION OF PROSTATE       HPI:    Evangelina Cardoza is a 80year old male, he is a LTC resident of [facility]  He has a  past medical history including but not limited to late onset Alzheimer dementia, CHF, HTN, GERD, BPH, ambulatory dysfunction seen in collaboration with nursing for Acute visit       At the time of my exam, patient was oob sitting in w/c  He did not appear in any distress, he is able to make his needs know, has clear coherant speech  He has hx of alzhimer's and is AAOx2 on exam  He denies pain,sob,n/v/d  He is unaware of the growth on his back, denies pain  Nursing state the growth has worsened since October  Patient should be seen by Dermatology for biopsy patient in agreement with plan  Review of Systems   Constitutional: Negative for activity change, chills and fever  Respiratory: Negative for cough and shortness of breath  Cardiovascular: Negative for chest pain  Gastrointestinal: Negative for abdominal pain  Musculoskeletal: Positive for gait problem  Skin: Positive for color change (Patient unaware of lesion to lower back- noticed by nursing )  Neurological: Positive for weakness  Objective:    VITALS:   Vitals:    02/09/22 1911   BP: (!) 140/48   Pulse: 68   Resp: 18   Temp: (!) 97 2 °F (36 2 °C)   SpO2: 96%          Physical Exam  Vitals and nursing note reviewed  Constitutional:       General: He is not in acute distress  HENT:      Head: Normocephalic and atraumatic  Cardiovascular:      Rate and Rhythm: Normal rate and regular rhythm  Pulmonary:      Effort: Pulmonary effort is normal  No respiratory distress  Breath sounds: Normal breath sounds  No rhonchi  Abdominal:      General: There is no distension  Palpations: Abdomen is soft  Skin:     General: Skin is warm and dry  Findings: Lesion present  Neurological:      Mental Status: He is alert               Current Outpatient Medications:     amLODIPine (NORVASC) 5 mg tablet, Take 5 mg by mouth daily, Disp: , Rfl:     finasteride (PROSCAR) 5 mg tablet, TAKE 1 TABLET (5 MG TOTAL) BY MOUTH DAILY BPH, Disp: 90 tablet, Rfl: 3    guaiFENesin (MUCINEX) 600 mg 12 hr tablet, Take 600 mg by mouth, Disp: , Rfl:     levothyroxine 50 mcg tablet, Take 50 mcg by mouth daily, Disp: , Rfl:     metoprolol succinate (TOPROL-XL) 50 mg 24 hr tablet, Take 50 mg by mouth daily, Disp: , Rfl: 1    multivitamin-iron-minerals-folic acid (CENTRUM) chewable tablet, Chew 1 tablet daily, Disp: , Rfl:     omeprazole (PriLOSEC) 20 mg delayed release capsule, Take 20 mg by mouth daily, Disp: , Rfl:     tamsulosin (FLOMAX) 0 4 mg, TAKE 1 CAPSULE BY MOUTH EVERY DAY WITH DINNER, Disp: 90 capsule, Rfl: 3    torsemide (DEMADEX) 10 mg tablet, Take 10 mg by mouth every other day, Disp: , Rfl:       Plan discussed with Dr Michael Morales- noted agreement with assessment and plan  Please note:  Voice-recognition software may have been used in the preparation of this document  Occasional wrong word or "sound-alike" substitutions may have occurred due to the inherent limitations of voice recognition software  Interpretation should be guided by AZALEA Waters  02/09/22  7:12 PM

## 2022-02-10 NOTE — ASSESSMENT & PLAN NOTE
· Noted in October of 2021  · Seen by [address] -treated with Hydrocortisone and recommendation to f/u with Dermatology  · Notified by nursing that lesion has grown since then and appears worse per nursing   · Upon exam -Left Lumbar area with raised lesion/tumor- it is Red,moist and irregular in shape and measures approx 3x3cm  · Patient denies pain and is unaware of the lesion/tumor   · Suspicious for melanoma  · Recommend Dermatology consult for biopsy  · Patient agreeable

## 2022-02-23 ENCOUNTER — NURSING HOME VISIT (OUTPATIENT)
Dept: WOUND CARE | Facility: HOSPITAL | Age: 87
End: 2022-02-23
Payer: COMMERCIAL

## 2022-02-23 DIAGNOSIS — L82.1 SEBORRHEIC KERATOSIS: Primary | ICD-10-CM

## 2022-02-23 DIAGNOSIS — R26.2 AMBULATORY DYSFUNCTION: ICD-10-CM

## 2022-02-23 PROCEDURE — 99307 SBSQ NF CARE SF MDM 10: CPT | Performed by: NURSE PRACTITIONER

## 2022-02-23 NOTE — ASSESSMENT & PLAN NOTE
Let lower back  - this is chronic, previously seen on 10/6 / 2021   Refer to dermatologist  - wound worsened as per report  -wound size is 2 5 x 2 4 x 0 1 cm , 100% granulation, raised lesion  - refer to dermatologist to rule out melanoma  - sign off

## 2022-02-23 NOTE — PATIENT INSTRUCTIONS
Orders Placed This Encounter   Procedures    Wound cleansing and dressings     Wound:  Left lower back  Discontinue previous wound order  Cleanse the wound bed with NSS   Apply bordered foam to wound bed  Frequency : daily and prn for soiling  Refer to dermatologist for biopsy and treatment  Offload all wounds  Turn and reposition frequently, maximum of every two hours  Instruct / Assist with weight shifting every 15 - 20 minutes when in chair  Increase protein intake  Monitor for any sign of infection or worsening, inform PCP or patient's primary physician in your facility       Standing Status:   Future     Standing Expiration Date:   2/23/2023

## 2022-02-25 NOTE — PROGRESS NOTES
[address] [facility]       Patient ID: Tali Duval is a 80 y o  male Date of Birth 12/25/1924     Location of Service: [address] [place] [address]    Performed wound round with: Wound team    Chief Complaint : Lower back    Wound Instructions:    See wound order    Allergies  Patient has no known allergies  Assessment & Plan:  1  Seborrheic keratosis  Assessment & Plan:  Let lower back  - this is chronic, previously seen on 10/6 / 2021  Refer to dermatologist  - wound worsened as per report  -wound size is 2 5 x 2 4 x 0 1 cm , 100% granulation, raised lesion  - refer to dermatologist to rule out melanoma  - sign off    Orders:  -     Wound cleansing and dressings; Future    2  Ambulatory dysfunction  Assessment & Plan:  On 24/7 restorative care             Subjective: This is a re- consult of wound on the left lower back  Patient was previously seen for the same lesion and was referred to dermatologist   As per report, the wound is worsening and patient have not seemed dermatologist   Etiology : keratosis  Patient denies pain  Review of Systems   Unable to perform ROS: Dementia       Objective: There were no vitals taken for this visit  Physical Exam  Constitutional:       Appearance: Normal appearance  HENT:      Head: Normocephalic and atraumatic  Ears:      Comments: [place]     Nose: Nose normal       Mouth/Throat:      Mouth: Mucous membranes are moist    Eyes:      Conjunctiva/sclera: Conjunctivae normal    Cardiovascular:      Pulses: Normal pulses  Pulmonary:      Effort: Pulmonary effort is normal    Abdominal:      Tenderness: There is no abdominal tenderness  Musculoskeletal:      Cervical back: Normal range of motion  Comments: LROM   Skin:     Findings: Lesion present        Comments: Left lower back - wound size is 2 5 x 2 4 x 0 1 cm  , 100% hypergranulation, periwound red, small amount of serous drainage, denies pain, no obvious sign of infection   Neurological:      Mental Status: He is alert  Gait: Gait abnormal    Psychiatric:         Mood and Affect: Mood normal          Behavior: Behavior normal               Procedures             Patient's care was coordinated with nursing facility staff  Recent vitals, labs and updated medications were reviewed on EMR or chart system of facility  Past Medical, surgical, social, medication and allergy history and patient's previous records were reviewed and updated as appropriate:     Patient Active Problem List   Diagnosis    Chronic cough    Gastroesophageal reflux disease without esophagitis    CAD (coronary artery disease)    Chronic diastolic (congestive) heart failure ([place] [address] )    Essential hypertension    Late onset Alzheimer dementia ([place] [address] )    BPH (benign prostatic hyperplasia)    Dermatitis associated with incontinence    Ambulatory dysfunction    Seborrheic keratosis     Past Medical History:   Diagnosis Date    Anxiety     Bilateral lower leg cellulitis 9/22/2021    BPH with obstruction/lower urinary tract symptoms     CAD (coronary artery disease)     Colon polyp     Glaucoma     Gross hematuria     History of thrombolytic therapy     Hypertension     Hypothyroidism     Major depressive disorder     Osteoarthritis     Paraphimosis     Peptic ulcer disease     SIRS of non-infectious origin w/o acute organ dysfunction (HCC)     Skin cancer (melanoma) (HCC)     SOB (shortness of breath)     Stroke ([place] [address] )    Stephanie Mare Uncircumcised male      Past Surgical History:   Procedure Laterality Date    CYSTOSCOPY  05/27/2016    TRANSURETHRAL RESECTION OF PROSTATE       Social History     Socioeconomic History    Marital status:       Spouse name: None    Number of children: None    Years of education: None    Highest education level: None   Occupational History    None   Tobacco Use    Smoking status: Former Smoker    Smokeless tobacco: Never Used   Substance and Sexual Activity    Alcohol use: No    Drug use: No    Sexual activity: None   Other Topics Concern    None   Social History Narrative    None     Social Determinants of Health     Financial Resource Strain: Not on file   Food Insecurity: Not on file   Transportation Needs: Not on file   Physical Activity: Not on file   Stress: Not on file   Social Connections: Not on file   Intimate Partner Violence: Not on file   Housing Stability: Not on file        Current Outpatient Medications:     amLODIPine (NORVASC) 5 mg tablet, Take 5 mg by mouth daily, Disp: , Rfl:     finasteride (PROSCAR) 5 mg tablet, TAKE 1 TABLET (5 MG TOTAL) BY MOUTH DAILY BPH, Disp: 90 tablet, Rfl: 3    guaiFENesin (MUCINEX) 600 mg 12 hr tablet, Take 600 mg by mouth, Disp: , Rfl:     levothyroxine 50 mcg tablet, Take 50 mcg by mouth daily, Disp: , Rfl:     metoprolol succinate (TOPROL-XL) 50 mg 24 hr tablet, Take 50 mg by mouth daily, Disp: , Rfl: 1    multivitamin-iron-minerals-folic acid (CENTRUM) chewable tablet, Chew 1 tablet daily, Disp: , Rfl:     omeprazole (PriLOSEC) 20 mg delayed release capsule, Take 20 mg by mouth daily, Disp: , Rfl:     tamsulosin (FLOMAX) 0 4 mg, TAKE 1 CAPSULE BY MOUTH EVERY DAY WITH DINNER, Disp: 90 capsule, Rfl: 3    torsemide (DEMADEX) 10 mg tablet, Take 10 mg by mouth every other day, Disp: , Rfl:   History reviewed  No pertinent family history  Coordination of Care:  Wound team aware of the treatment plan    Patient / Staff education :  Staff was given education on sign of infection and pressure ulcer prevention  Staff verbalized understanding     Follow up :  Return sign off  Voice-recognition software may have been used in the preparation of this document  Occasional wrong word or "sound-alike" substitutions may have occurred due to the inherent limitations of voice recognition software  Interpretation should be guided by context        AZALEA Strickland

## 2022-03-29 ENCOUNTER — NURSING HOME VISIT (OUTPATIENT)
Dept: GERIATRICS | Facility: OTHER | Age: 87
End: 2022-03-29
Payer: COMMERCIAL

## 2022-03-29 VITALS
WEIGHT: 184 LBS | TEMPERATURE: 97.6 F | SYSTOLIC BLOOD PRESSURE: 134 MMHG | BODY MASS INDEX: 30.62 KG/M2 | HEART RATE: 62 BPM | OXYGEN SATURATION: 97 % | DIASTOLIC BLOOD PRESSURE: 89 MMHG

## 2022-03-29 DIAGNOSIS — I10 ESSENTIAL HYPERTENSION: Primary | ICD-10-CM

## 2022-03-29 DIAGNOSIS — R26.2 AMBULATORY DYSFUNCTION: ICD-10-CM

## 2022-03-29 DIAGNOSIS — K21.9 GASTROESOPHAGEAL REFLUX DISEASE WITHOUT ESOPHAGITIS: ICD-10-CM

## 2022-03-29 DIAGNOSIS — I50.32 CHRONIC DIASTOLIC (CONGESTIVE) HEART FAILURE (HCC): ICD-10-CM

## 2022-03-29 DIAGNOSIS — I25.10 CORONARY ARTERY DISEASE WITHOUT ANGINA PECTORIS, UNSPECIFIED VESSEL OR LESION TYPE, UNSPECIFIED WHETHER NATIVE OR TRANSPLANTED HEART: ICD-10-CM

## 2022-03-29 DIAGNOSIS — N40.0 BENIGN PROSTATIC HYPERPLASIA, UNSPECIFIED WHETHER LOWER URINARY TRACT SYMPTOMS PRESENT: ICD-10-CM

## 2022-03-29 PROCEDURE — 99309 SBSQ NF CARE MODERATE MDM 30: CPT | Performed by: FAMILY MEDICINE

## 2022-03-29 NOTE — PROGRESS NOTES
[facility]  [address]  [phone]  [place]  POS32      NAME: Sarai Travis  AGE: 80 y o  SEX: male [id number]    DATE OF ENCOUNTER: 3/29/2022    Assessment and Plan     1  Essential hypertension  Stable  Continue Amlodipine 5mg daily  Continue Metoprolol tartrate 50mg  Daily  Continue Torsemide 10 mg daily    2  Gastroesophageal reflux disease without esophagitis  Stable  Continue Omeprazole 20mg daily    3  Coronary artery disease without angina pectoris, unspecified vessel or lesion type, unspecified whether native or transplanted heart  Stable  Asymptomatic  Continue ASA 81mg daily  Continue Metoprolol 50mg  BID  Continue to monitor for symptoms  4  Chronic diastolic (congestive) heart failure (HCC)  Euvolemic   Weight today: 184 lbs , Weight on 02/03/22: 182  lbs  Continue Metoprolol 50mg daily  Continue Torsemide 10 mg daily    5  Ambulatory dysfunction  Continue to implement fall precautions  6  Benign prostatic hyperplasia, unspecified whether lower urinary tract symptoms present  Stable  Continue Flomax 0 4mg   Continue Finasteride 5 mg daily    - Counseling Documentation: patient was counseled regarding: prognosis    Chief Complaint     Routine nursing home follow up visit    History of Present Illness     80year old being seen today for routine nursing home visit for follow up of chronic medical conditions including Alzheimer, CHF, HTN, GERD, ambulatory dysfunction  Pt is comfortable, not in acute distress, has clear speech  Denies any acute concerns today  80year old being seen today for routine nursing home visit for follow up of chronic medical conditions including Alzheimer, CHF, HTN, GERD, ambulatory dysfunction  Pt is comfortable, not in acute distress, has clear speech  Denies any acute concerns today        The following portions of the patient's history were reviewed and updated as appropriate: allergies, current medications, past family history, past medical history, past social history, past surgical history and problem list     Review of Systems     Review of Systems   Constitutional: Negative for chills and fever  HENT: Negative for congestion and sore throat  Respiratory: Negative for cough and shortness of breath  Cardiovascular: Negative for chest pain  Gastrointestinal: Negative for abdominal pain  Musculoskeletal: Negative for back pain and neck pain  Skin: Negative for rash  Neurological: Negative for weakness and headaches  Psychiatric/Behavioral: Negative for agitation and behavioral problems  Active Problem List     Patient Active Problem List   Diagnosis    Chronic cough    Gastroesophageal reflux disease without esophagitis    CAD (coronary artery disease)    Chronic diastolic (congestive) heart failure (HCC)    Essential hypertension    Late onset Alzheimer dementia ([facility] [address] )    BPH (benign prostatic hyperplasia)    Dermatitis associated with incontinence    Ambulatory dysfunction    Seborrheic keratosis       Objective     /89   Pulse 62   Temp 97 6 °F (36 4 °C)   Wt 83 5 kg (184 lb)   SpO2 97%   BMI 30 62 kg/m²     Physical Exam  Vitals reviewed  Constitutional:       General: He is not in acute distress  Appearance: He is well-developed  He is not ill-appearing  HENT:      Head: Normocephalic and atraumatic  Eyes:      Conjunctiva/sclera: Conjunctivae normal    Cardiovascular:      Rate and Rhythm: Normal rate and regular rhythm  Heart sounds: Normal heart sounds  No murmur heard  Pulmonary:      Effort: Pulmonary effort is normal  No respiratory distress  Breath sounds: Normal breath sounds  Abdominal:      General: Abdomen is flat  Bowel sounds are normal  There is no distension  Palpations: Abdomen is soft  Musculoskeletal:         General: Normal range of motion  Cervical back: Normal range of motion  Right lower leg: Edema present  Left lower leg: Edema present  Comments: +1 bilateral baseline   Skin:     General: Skin is warm and dry  Neurological:      Mental Status: Mental status is at baseline  Psychiatric:         Mood and Affect: Mood normal          Behavior: Behavior normal          Pertinent Laboratory/Diagnostic Studies:  Refer to facility chart  Current Medications   Medications reviewed and updated in facility chart

## 2022-05-06 ENCOUNTER — NURSING HOME VISIT (OUTPATIENT)
Dept: GERIATRICS | Facility: OTHER | Age: 87
End: 2022-05-06
Payer: COMMERCIAL

## 2022-05-06 DIAGNOSIS — R26.2 AMBULATORY DYSFUNCTION: ICD-10-CM

## 2022-05-06 DIAGNOSIS — G30.1 LATE ONSET ALZHEIMER'S DEMENTIA WITHOUT BEHAVIORAL DISTURBANCE (HCC): Primary | ICD-10-CM

## 2022-05-06 DIAGNOSIS — I25.10 CORONARY ARTERY DISEASE WITHOUT ANGINA PECTORIS, UNSPECIFIED VESSEL OR LESION TYPE, UNSPECIFIED WHETHER NATIVE OR TRANSPLANTED HEART: ICD-10-CM

## 2022-05-06 DIAGNOSIS — N40.0 BENIGN PROSTATIC HYPERPLASIA, UNSPECIFIED WHETHER LOWER URINARY TRACT SYMPTOMS PRESENT: ICD-10-CM

## 2022-05-06 DIAGNOSIS — F02.80 LATE ONSET ALZHEIMER'S DEMENTIA WITHOUT BEHAVIORAL DISTURBANCE (HCC): Primary | ICD-10-CM

## 2022-05-06 DIAGNOSIS — I10 ESSENTIAL HYPERTENSION: ICD-10-CM

## 2022-05-06 DIAGNOSIS — E03.9 HYPOTHYROIDISM, UNSPECIFIED TYPE: ICD-10-CM

## 2022-05-06 DIAGNOSIS — I50.32 CHRONIC DIASTOLIC (CONGESTIVE) HEART FAILURE (HCC): ICD-10-CM

## 2022-05-06 PROCEDURE — 99309 SBSQ NF CARE MODERATE MDM 30: CPT | Performed by: NURSE PRACTITIONER

## 2022-05-10 PROBLEM — E03.9 HYPOTHYROIDISM: Status: ACTIVE | Noted: 2022-05-10

## 2022-05-10 RX ORDER — BENZONATATE 100 MG/1
100 CAPSULE ORAL 3 TIMES DAILY PRN
COMMUNITY

## 2022-05-10 RX ORDER — PREDNISOLONE ACETATE 10 MG/ML
1 SUSPENSION/ DROPS OPHTHALMIC DAILY
COMMUNITY

## 2022-05-10 RX ORDER — METOPROLOL TARTRATE 50 MG/1
50 TABLET, FILM COATED ORAL EVERY 12 HOURS SCHEDULED
COMMUNITY

## 2022-05-10 RX ORDER — ATROPINE SULFATE 10 MG/ML
1 SOLUTION/ DROPS OPHTHALMIC DAILY
COMMUNITY

## 2022-05-10 RX ORDER — TORSEMIDE 10 MG/1
10 TABLET ORAL DAILY
COMMUNITY

## 2022-05-10 RX ORDER — ASPIRIN 81 MG/1
81 TABLET, CHEWABLE ORAL DAILY
COMMUNITY

## 2022-07-14 ENCOUNTER — NURSING HOME VISIT (OUTPATIENT)
Dept: GERIATRICS | Facility: OTHER | Age: 87
End: 2022-07-14
Payer: COMMERCIAL

## 2022-07-14 DIAGNOSIS — I50.32 CHRONIC DIASTOLIC (CONGESTIVE) HEART FAILURE (HCC): ICD-10-CM

## 2022-07-14 DIAGNOSIS — I10 ESSENTIAL HYPERTENSION: Primary | ICD-10-CM

## 2022-07-14 DIAGNOSIS — R26.2 AMBULATORY DYSFUNCTION: ICD-10-CM

## 2022-07-14 DIAGNOSIS — N40.0 BENIGN PROSTATIC HYPERPLASIA, UNSPECIFIED WHETHER LOWER URINARY TRACT SYMPTOMS PRESENT: ICD-10-CM

## 2022-07-14 DIAGNOSIS — E03.9 HYPOTHYROIDISM, UNSPECIFIED TYPE: ICD-10-CM

## 2022-07-14 PROCEDURE — 99309 SBSQ NF CARE MODERATE MDM 30: CPT | Performed by: FAMILY MEDICINE

## 2022-07-14 NOTE — PROGRESS NOTES
[facility]  [address]  [phone]  [address]  [place]      NAME: Sheridan Fajardo  AGE: 80 y o  SEX: male [phone]    DATE OF ENCOUNTER: 7/14/2022    Assessment and Plan     1  Essential hypertension  - continue metoprolol tartrate 50 mg p o  b i d   - continue aspirin 81 mg p o  daily  - continue amlodipine 5 mg p o  daily    2  Chronic diastolic (congestive) heart failure (HCC)  - continue torsemide 10 mg p o  daily  - monitor weights    3  Hypothyroidism, unspecified type  - continue levothyroxine 75 mcg p o  in a m   - last TSH on 3/22 was 3 0    4  Benign prostatic hyperplasia, unspecified whether lower urinary tract symptoms present  - stable  - continue tamsulosin 0 4 mg p o  daily  - continue finasteride 5 mg p o  daily    5  Ambulatory dysfunction  - Fall precautions in place  - Uses walker  Lab work (CBC, CMP, TSH) in 3/22 was WNL  He is DNR/DNI     - Counseling Documentation: patient was counseled regarding: risk factor reductions    Chief Complaint     Routine Long term follow-up visit  History of Present Illness     HPI   Christiano Lovell, a 79 y/o male is a long-term resident at [facility], seen and examined at bedside, stable  He is alert, verbal, able to give some history  He denies any complaints at this time  He is eating and sleeping well  He needs mod assistance with ADLs  He denies any recent falls or hospitalizations  Staff have no concerns at this time  The following portions of the patient's history were reviewed and updated as appropriate: allergies, current medications, past family history, past medical history, past social history, past surgical history and problem list     Review of Systems     Review of Systems   Constitutional: Negative for activity change and fatigue  HENT: Positive for hearing loss  Eyes: Negative  Respiratory: Negative  Cardiovascular: Negative  Gastrointestinal: Negative      Genitourinary: Negative  Musculoskeletal: Positive for arthralgias and gait problem  Neurological: Positive for weakness  Psychiatric/Behavioral: Negative  All other systems reviewed and are negative  As in HPI  Active Problem List     Patient Active Problem List   Diagnosis    Chronic cough    Gastroesophageal reflux disease without esophagitis    CAD (coronary artery disease)    Chronic diastolic (congestive) heart failure (HCC)    Essential hypertension    Late onset Alzheimer dementia ([facility] [address] )    BPH (benign prostatic hyperplasia)    Dermatitis associated with incontinence    Ambulatory dysfunction    Seborrheic keratosis    Hypothyroidism       Objective     Vitals: T: 97 9, P: 71, R: 16, BP: 121/64, 94% on RA, Wt: 186 6 lbs    Physical Exam  Vitals and nursing note reviewed  Constitutional:       General: He is not in acute distress  Appearance: Normal appearance  He is well-developed  He is not diaphoretic  HENT:      Head: Normocephalic and atraumatic  Nose: Nose normal       Mouth/Throat:      Mouth: Mucous membranes are moist       Pharynx: Oropharynx is clear  No oropharyngeal exudate  Eyes:      General: No scleral icterus  Right eye: No discharge  Left eye: No discharge  Extraocular Movements: Extraocular movements intact  Conjunctiva/sclera: Conjunctivae normal    Cardiovascular:      Rate and Rhythm: Normal rate and regular rhythm  Heart sounds: Normal heart sounds  No murmur heard  Pulmonary:      Effort: Pulmonary effort is normal  No respiratory distress  Breath sounds: Normal breath sounds  No wheezing  Chest:      Chest wall: No tenderness  Abdominal:      General: Bowel sounds are normal  There is no distension  Palpations: Abdomen is soft  Tenderness: There is no abdominal tenderness  There is no guarding  Musculoskeletal:         General: No tenderness or deformity  Normal range of motion        Cervical back: Normal range of motion and neck supple  Right lower leg: No edema  Left lower leg: No edema  Skin:     General: Skin is warm and dry  Neurological:      Mental Status: He is alert and oriented to person, place, and time  Cranial Nerves: No cranial nerve deficit  Motor: No abnormal muscle tone  Coordination: Coordination normal    Psychiatric:         Mood and Affect: Mood normal          Behavior: Behavior normal          Pertinent Laboratory/Diagnostic Studies:  Refer to facility chart  Current Medications   Medications reviewed and updated in facility chart

## 2022-09-09 ENCOUNTER — NURSING HOME VISIT (OUTPATIENT)
Dept: GERIATRICS | Facility: OTHER | Age: 87
End: 2022-09-09
Payer: COMMERCIAL

## 2022-09-09 VITALS
HEART RATE: 68 BPM | WEIGHT: 182.5 LBS | TEMPERATURE: 97.7 F | DIASTOLIC BLOOD PRESSURE: 62 MMHG | SYSTOLIC BLOOD PRESSURE: 116 MMHG | OXYGEN SATURATION: 96 % | BODY MASS INDEX: 30.37 KG/M2 | RESPIRATION RATE: 18 BRPM

## 2022-09-09 DIAGNOSIS — I10 ESSENTIAL HYPERTENSION: ICD-10-CM

## 2022-09-09 DIAGNOSIS — I50.32 CHRONIC DIASTOLIC (CONGESTIVE) HEART FAILURE (HCC): Primary | ICD-10-CM

## 2022-09-09 DIAGNOSIS — E03.9 HYPOTHYROIDISM, UNSPECIFIED TYPE: ICD-10-CM

## 2022-09-09 DIAGNOSIS — I25.10 CORONARY ARTERY DISEASE INVOLVING NATIVE HEART WITHOUT ANGINA PECTORIS, UNSPECIFIED VESSEL OR LESION TYPE: ICD-10-CM

## 2022-09-09 PROCEDURE — 99309 SBSQ NF CARE MODERATE MDM 30: CPT

## 2022-09-09 NOTE — ASSESSMENT & PLAN NOTE
· History of hypothyroidism  · Stable  · Most recent TSH 3 08  · Continue levothyroxine 75 mcg per day

## 2022-09-09 NOTE — PROGRESS NOTES
[facility]  [address]  [phone]  [place]  [address] ([facility])[address]        NAME: Ronny Baker  AGE: 80 y o  SEX: male CODE STATUS: No CPR    DATE OF ENCOUNTER: 9/9/2022    Assessment and Plan     1  Chronic diastolic (congestive) heart failure [facility])  Assessment & Plan:  Wt Readings from Last 3 Encounters:   09/09/22 82 8 kg (182 lb 8 oz)   03/29/22 83 5 kg (184 lb)   02/09/22 82 6 kg (182 lb)     · History of CHF  · Appears euvolemic  · Weight is unchanged  Most recent weight 82 8 kg  · Continue to monitor for weight gain, SOB, edema  · Continue torsemide 10 mg daily        2  Essential hypertension  Assessment & Plan:  · History of hypertension  · BP stable  · Continue amlodipine 5 mg daily  · Continue metoprolol 50 mg b i d   · MUSHTAQ diet      3  Hypothyroidism, unspecified type  Assessment & Plan:  · History of hypothyroidism  · Stable  · Most recent TSH 3 08  · Continue levothyroxine 75 mcg per day        4  Coronary artery disease involving native heart without angina pectoris, unspecified vessel or lesion type  Assessment & Plan:  · Stable  · No cardiac symptoms  · Continue aspirin 81 mg daily  · Continue metoprolol 50 mg b i d  All medications and routine orders were reviewed and updated as needed      Chief Complaint     LTC follow up visit       Past Medical and Surgica History      Past Medical History:   Diagnosis Date    Anxiety     Bilateral lower leg cellulitis 9/22/2021    BPH with obstruction/lower urinary tract symptoms     CAD (coronary artery disease)     Colon polyp     Glaucoma     Gross hematuria     History of thrombolytic therapy     Hypertension     Hypothyroidism     Major depressive disorder     Osteoarthritis     Paraphimosis     Peptic ulcer disease     SIRS of non-infectious origin w/o acute organ dysfunction (HCC)     Skin cancer (melanoma) (HCC)     SOB (shortness of breath)     Stroke ([address] )     Uncircumcised male      Past Surgical History:   Procedure Laterality Date    CYSTOSCOPY  05/27/2016    TRANSURETHRAL RESECTION OF PROSTATE       No Known Allergies       History of Present Illness     WALDO Encarnacion is a 80year old male, he is a LTC resident of [facility]  Past Medical Hx including but not limited to CAD, BPH, HTN, hypothyroidism, CHF, and ambulatory dysfunction was seen in collaboration with nursing for medical mgmt and LTC follow up  Seen and examined at bedside today  Patient was a good historian  Patient was out of bed in wheelchair  AAO x3 and did not appear to be in any distress  Patient reported feeling well and denies any CP/SOB/N/V/D  Denies lightheadedness, dizziness, headaches, vision changes  Patient states he is eating well and staying hydrated  Denies any bowel or bladder issues  Per review of SNF records, Patient is eating 3 meals per day, consuming 100%, with supplemental nutritional shakes  Last documented BM 09/09/2022  No concerns from nursing at this time  The patient's allergies, past medical, surgical, social and family history were reviewed and unchanged  Review of Systems     Review of Systems   Constitutional: Negative  Negative for activity change, appetite change, chills, fatigue and fever  HENT: Negative for congestion  Eyes: Negative  Respiratory: Negative for cough, chest tightness and shortness of breath  Cardiovascular: Negative for chest pain, palpitations and leg swelling  Gastrointestinal: Negative for abdominal distention, abdominal pain and constipation  Endocrine: Negative  Genitourinary: Negative for difficulty urinating  Musculoskeletal: Positive for gait problem  Skin: Negative  Allergic/Immunologic: Negative  Neurological: Negative for dizziness, weakness, light-headedness, numbness and headaches  Hematological: Negative  Psychiatric/Behavioral: Negative for behavioral problems and dysphoric mood   The patient is not nervous/anxious  Objective     Vitals:   Vitals:    09/09/22 1645   BP: 116/62   Pulse: 68   Resp: 18   Temp: 97 7 °F (36 5 °C)   SpO2: 96%         Physical Exam  Constitutional:       General: He is not in acute distress  Appearance: Normal appearance  He is not ill-appearing  HENT:      Head: Normocephalic and atraumatic  Nose: Nose normal  No congestion  Mouth/Throat:      Mouth: Mucous membranes are moist    Eyes:      Conjunctiva/sclera: Conjunctivae normal    Cardiovascular:      Rate and Rhythm: Normal rate and regular rhythm  Pulses: Normal pulses  Heart sounds: Normal heart sounds  Pulmonary:      Effort: No respiratory distress  Breath sounds: No wheezing  Abdominal:      General: There is no distension  Palpations: Abdomen is soft  Tenderness: There is no abdominal tenderness  Musculoskeletal:      Right lower leg: Edema present  Left lower leg: Edema present  Skin:     General: Skin is warm and dry  Capillary Refill: Capillary refill takes 2 to 3 seconds  Neurological:      Mental Status: He is alert and oriented to person, place, and time  Mental status is at baseline  Gait: Gait abnormal    Psychiatric:         Mood and Affect: Mood normal          Behavior: Behavior normal          Pertinent Laboratory/Diagnostic Studies:   Reviewed in facility chart-stable      Current Medications   Medications reviewed and updated see facility [facility] for details        Current Outpatient Medications:     amLODIPine (NORVASC) 5 mg tablet, Take 5 mg by mouth daily, Disp: , Rfl:     aspirin 81 mg chewable tablet, Chew 81 mg daily, Disp: , Rfl:     atropine (ISOPTO ATROPINE) 1 % ophthalmic solution, Administer 1 drop into the left eye in the morning, Disp: , Rfl:     benzonatate (TESSALON PERLES) 100 mg capsule, Take 100 mg by mouth 3 (three) times a day as needed, Disp: , Rfl:     finasteride (PROSCAR) 5 mg tablet, TAKE 1 TABLET (5 MG TOTAL) BY MOUTH DAILY BPH, Disp: 90 tablet, Rfl: 3    levothyroxine 50 mcg tablet, Take 75 mcg by mouth daily , Disp: , Rfl:     metoprolol tartrate (LOPRESSOR) 50 mg tablet, Take 50 mg by mouth every 12 (twelve) hours, Disp: , Rfl:     multivitamin-iron-minerals-folic acid (CENTRUM) chewable tablet, Chew 1 tablet daily, Disp: , Rfl:     prednisoLONE acetate (PRED FORTE) 1 % ophthalmic suspension, Administer 1 drop into the left eye in the morning, Disp: , Rfl:     tamsulosin (FLOMAX) 0 4 mg, TAKE 1 CAPSULE BY MOUTH EVERY DAY WITH DINNER, Disp: 90 capsule, Rfl: 3    torsemide (DEMADEX) 10 mg tablet, Take 10 mg by mouth daily, Disp: , Rfl:      Plan discussed with Dr Quoc Griffin noted agreement with assessment and plan  Please note:  Voice-recognition software may have been used in the preparation of this document  Occasional wrong word or "sound-alike" substitutions may have occurred due to the inherent limitations of voice recognition software  Interpretation should be guided by context           [facility] AZALEA Cunningham  9/9/2022  5:14 PM

## 2022-09-09 NOTE — ASSESSMENT & PLAN NOTE
· History of hypertension  · BP stable  · Continue amlodipine 5 mg daily  · Continue metoprolol 50 mg b i d   · MUSHTAQ diet

## 2022-09-09 NOTE — ASSESSMENT & PLAN NOTE
Wt Readings from Last 3 Encounters:   09/09/22 82 8 kg (182 lb 8 oz)   03/29/22 83 5 kg (184 lb)   02/09/22 82 6 kg (182 lb)     · History of CHF  · Appears euvolemic  · Weight is unchanged    Most recent weight 82 8 kg  · Continue to monitor for weight gain, SOB, edema  · Continue torsemide 10 mg daily

## 2022-11-09 ENCOUNTER — NURSING HOME VISIT (OUTPATIENT)
Dept: GERIATRICS | Facility: OTHER | Age: 87
End: 2022-11-09

## 2022-11-09 DIAGNOSIS — I50.32 CHRONIC DIASTOLIC (CONGESTIVE) HEART FAILURE (HCC): Primary | ICD-10-CM

## 2022-11-09 DIAGNOSIS — N40.0 BENIGN PROSTATIC HYPERPLASIA, UNSPECIFIED WHETHER LOWER URINARY TRACT SYMPTOMS PRESENT: ICD-10-CM

## 2022-11-09 DIAGNOSIS — I25.10 CORONARY ARTERY DISEASE INVOLVING NATIVE HEART WITHOUT ANGINA PECTORIS, UNSPECIFIED VESSEL OR LESION TYPE: ICD-10-CM

## 2022-11-09 DIAGNOSIS — E03.9 HYPOTHYROIDISM, UNSPECIFIED TYPE: ICD-10-CM

## 2022-11-09 DIAGNOSIS — I10 ESSENTIAL HYPERTENSION: ICD-10-CM

## 2022-11-09 NOTE — PROGRESS NOTES
[facility]  [address]  [phone]  [facility]  [address]      NAME: Yosef Hayes  AGE: 80 y o  SEX: male [phone]    DATE OF ENCOUNTER: 11/9/2022    Assessment and Plan   1) Chronic diastolic CHF  8/09 Echo LVEF 60-65%  Last weight 182 pounds in 9/92022  184 on 11/2/2022  Pt euvolemic today  Pt does have chronic BLE edema  Last Cr 0 94 on 3/2/2022  Cont torsemide 10 mg 1 tab po daily    2) HTN  118/68  Controlled  Cont metoprolol 50 mg 1 tab po BID  Cont amlodipine 5 mg 1 tab po daily  Low salt diet    3) CAD  Stable  No CP  Cont metoprolol 50 mg 1 tab po BID  Cont aspirin 81 mg 1 tab po daily    4) Hypothyroidism  Last Tsh in 09/2022 wnl  Cont levothyroxine 75 mcg 1 tab po qam  Repeat labs in 6 months     5) Hx BPH  Cont current meds    Finasteride Tablet 5 MG     Tamsulosin HCl Capsule 0 4 MG       Code status: DNR/DNI    Chief Complaint     Routine Long term follow-up visit  (60 day)    History of Present Illness   79 y/o M with PMH CAD, chronic diastolic CHF, HTN, Late onset Alzheimer dementia, BPH, and hypothyroidism  Pt seen and examined as part of 60 day follow up visit  Pt sitting comfortably in his room  Other residents playing [employer] (formerly [employer] Dr  He has chronic leg swelling but no shortness of breath  Pt appears comfortable  The following portions of the patient's history were reviewed and updated as appropriate: allergies, current medications, past family history, past medical history, past social history, past surgical history and problem list     Review of Systems     Review of Systems   All other systems reviewed and are negative      Active Problem List     Patient Active Problem List   Diagnosis   • Chronic cough   • Gastroesophageal reflux disease without esophagitis   • CAD (coronary artery disease)   • Chronic diastolic (congestive) heart failure (HCC)   • Essential hypertension   • Late onset Alzheimer dementia (HCC)   • BPH (benign prostatic hyperplasia)   • Dermatitis associated with incontinence   • Ambulatory dysfunction   • Seborrheic keratosis   • Hypothyroidism     Current Medications     Aspirin Low Dose Tablet Chewable 81 MG (Aspirin)     amLODIPine Besylate Tablet 5 MG     Finasteride Tablet 5 MG     Tamsulosin HCl Capsule 0 4 MG     prednisoLONE Acetate Suspension 1 %     Atropine Sulfate Solution 1 %     Torsemide Tablet 10 MG     Levothyroxine Sodium Tablet 75 MCG     Centrum Silver Tablet (Multiple Vitamins-Minerals)     Metoprolol Tartrate Tablet 50 MG         Objective     Vitals:   10/29/2022 09:43 118 / 68 mmHg     10/29/2022 09:43 97 4 °F     10/29/2022 09:43 73 bpm     10/29/2022 09:43 96 0 %     RR 18    Physical Exam  Constitutional:       General: He is not in acute distress  Appearance: He is not ill-appearing  HENT:      Head: Normocephalic and atraumatic  Cardiovascular:      Rate and Rhythm: Normal rate and regular rhythm  Pulses: Normal pulses  Heart sounds: Normal heart sounds  No murmur heard  Pulmonary:      Effort: No respiratory distress  Breath sounds: Normal breath sounds  No wheezing  Abdominal:      General: Bowel sounds are normal  There is no distension  Palpations: Abdomen is soft  Tenderness: There is no abdominal tenderness  Skin:     Comments: Skin: clean, dry intact  Legs - no cyanosis, clubbing; +1-2 b/l pitting edema   Neurological:      Mental Status: He is alert  Mental status is at baseline  Psychiatric:         Mood and Affect: Mood normal       Comments: Calm, cooperative       Pertinent Laboratory/Diagnostic Studies:  Refer to facility chart  Current Medications   Medications reviewed and updated in facility chart      Jamel Holcomb MD  Internal Medicine  Senior Care Physician

## 2022-12-21 ENCOUNTER — NURSING HOME VISIT (OUTPATIENT)
Dept: GERIATRICS | Facility: OTHER | Age: 87
End: 2022-12-21

## 2022-12-21 VITALS
HEART RATE: 76 BPM | TEMPERATURE: 97.7 F | DIASTOLIC BLOOD PRESSURE: 70 MMHG | RESPIRATION RATE: 20 BRPM | OXYGEN SATURATION: 96 % | SYSTOLIC BLOOD PRESSURE: 122 MMHG

## 2022-12-21 DIAGNOSIS — E03.9 HYPOTHYROIDISM, UNSPECIFIED TYPE: ICD-10-CM

## 2022-12-21 DIAGNOSIS — N40.0 BENIGN PROSTATIC HYPERPLASIA, UNSPECIFIED WHETHER LOWER URINARY TRACT SYMPTOMS PRESENT: ICD-10-CM

## 2022-12-21 DIAGNOSIS — I50.32 CHRONIC DIASTOLIC (CONGESTIVE) HEART FAILURE (HCC): ICD-10-CM

## 2022-12-21 DIAGNOSIS — I10 ESSENTIAL HYPERTENSION: Primary | ICD-10-CM

## 2022-12-21 NOTE — ASSESSMENT & PLAN NOTE
Wt Readings from Last 3 Encounters:   09/09/22 82 8 kg (182 lb 8 oz)   03/29/22 83 5 kg (184 lb)   02/09/22 82 6 kg (182 lb)     · History of CHF  · Appears euvolemic  · Most recent weight 82 8 kg  · Chronic bilateral lower extremity edema  · Continue to wear compression stockings  · Continue to monitor for weight gain, SOB, increased edema  · Continue torsemide 10 mg daily

## 2022-12-21 NOTE — PROGRESS NOTES
[facility]  [address]  [phone]  [place]  [address] ([facility])        NAME: Eva Reeves  AGE: 80 y o  SEX: male CODE STATUS: No CPR    DATE OF ENCOUNTER: 12/21/2022    Assessment and Plan     1  Essential hypertension  Assessment & Plan:  · History of hypertension  · BP stable, 122/70  · Continue amlodipine 5 mg daily  · Continue metoprolol 50 mg b i d   · Encourage MUSHTAQ diet      2  Benign prostatic hyperplasia, unspecified whether lower urinary tract symptoms present  Assessment & Plan:  · BPH history  · Continue tamsulosin 0 4 milligrams daily  · Continue finasteride 5 milligrams daily  · Monitor for S/S of urinary retention      3  Chronic diastolic (congestive) heart failure (HCC)  Assessment & Plan:  Wt Readings from Last 3 Encounters:   09/09/22 82 8 kg (182 lb 8 oz)   03/29/22 83 5 kg (184 lb)   02/09/22 82 6 kg (182 lb)     · History of CHF  · Appears euvolemic  · Most recent weight 82 8 kg  · Chronic bilateral lower extremity edema  · Continue to wear compression stockings  · Continue to monitor for weight gain, SOB, increased edema  · Continue torsemide 10 mg daily        4  Hypothyroidism, unspecified type  Assessment & Plan:  · TSH on 09/16/2022 was 2 95  · Recheck TSH in March of 2023  · Continue levothyroxine 75 micrograms daily         All medications and routine orders were reviewed and updated as needed  Chief Complaint     [facility] follow up visit   Patient's care was coordinated with nursing facility staff  Recent vitals, labs, and updated medications were review on [facility] system in facility    Past Medical and Surgical History      Past Medical History:   Diagnosis Date   • Anxiety    • Bilateral lower leg cellulitis 9/22/2021   • BPH with obstruction/lower urinary tract symptoms    • CAD (coronary artery disease)    • Colon polyp    • Glaucoma    • Gross hematuria    • History of thrombolytic therapy    • Hypertension    • Hypothyroidism    • Major depressive disorder    • Osteoarthritis    • Paraphimosis    • Peptic ulcer disease    • SIRS of non-infectious origin w/o acute organ dysfunction (HCC)    • Skin cancer (melanoma) (HCC)    • SOB (shortness of breath)    • Stroke [facility])    • Uncircumcised male      Past Surgical History:   Procedure Laterality Date   • CYSTOSCOPY  05/27/2016   • TRANSURETHRAL RESECTION OF PROSTATE       No Known Allergies       History of Present Illness     HPI  Tfifanie Matson is a 80-year-old male, he is a LTC resident of [facility] since 8/31/21  Past Medical Hx including but not limited to CAD, BPH, HTN, hypothyroidism, CHF, and ambulatory dysfunction was seen in collaboration with nursing for medical management and LTC follow up  Melanie Tenorio was seen and examined today  Patient is a good historian and is AAO x3  He is sitting in his wheelchair at bedside and does not appear to be in any distress  He denies pain and says he is feeling good  He says he has been sleeping well  He denies any CP/SOB/N/V/D  Denies lightheadedness, dizziness, headaches, vision changes  Patient states he is eating well and staying hydrated  Denies any bowel or bladder issues  Per review of SNF records, Patient is eating 3 meals per day, consuming %, with supplemental nutritional shakes  Last documented BM 12/21/2022  No concerns from nursing at this time  The patient's allergies, past medical, surgical, social and family history were reviewed and unchanged  Review of Systems     Review of Systems   Constitutional: Positive for activity change  Negative for appetite change, chills and fever  Uses wheelchair baseline   HENT: Negative  Negative for congestion  Eyes: Positive for visual disturbance  Glaucoma   Respiratory: Negative  Negative for cough, shortness of breath and wheezing  Gastrointestinal: Negative  Negative for abdominal distention, abdominal pain, constipation, diarrhea, nausea and vomiting  Endocrine: Negative  Genitourinary: Negative for difficulty urinating  Musculoskeletal: Positive for gait problem  Skin: Negative  Allergic/Immunologic: Negative  Neurological: Positive for weakness  Negative for dizziness, light-headedness and headaches  Hematological: Negative  Psychiatric/Behavioral: Negative  Negative for confusion and sleep disturbance  Objective     Vitals:   Vitals:    12/20/22 1503   BP: 122/70   Pulse: 76   Resp: 20   Temp: 97 7 °F (36 5 °C)   SpO2: 96%         Physical Exam  Vitals and nursing note reviewed  Constitutional:       General: He is not in acute distress  Appearance: Normal appearance  He is not ill-appearing  HENT:      Head: Normocephalic and atraumatic  Nose: No congestion  Mouth/Throat:      Mouth: Mucous membranes are moist    Eyes:      Conjunctiva/sclera: Conjunctivae normal    Cardiovascular:      Rate and Rhythm: Normal rate and regular rhythm  Pulses: Normal pulses  Heart sounds: Normal heart sounds  Pulmonary:      Effort: Pulmonary effort is normal       Breath sounds: Normal breath sounds  Abdominal:      General: Bowel sounds are normal  There is no distension  Palpations: Abdomen is soft  Tenderness: There is no abdominal tenderness  Musculoskeletal:      Right lower leg: Edema present  Left lower leg: Edema present  Comments: Chronic bilateral lower extremity edema, wears compression stockings   Skin:     General: Skin is warm  Capillary Refill: Capillary refill takes more than 3 seconds  Neurological:      Mental Status: He is alert and oriented to person, place, and time  Motor: Weakness present        Gait: Gait abnormal    Psychiatric:         Mood and Affect: Mood normal          Behavior: Behavior normal          Pertinent Laboratory/Diagnostic Studies:   Reviewed in facility chart-stable      Current Medications   Medications reviewed and updated see facility [facility] for details  Current Outpatient Medications:   •  amLODIPine (NORVASC) 5 mg tablet, Take 5 mg by mouth daily, Disp: , Rfl:   •  aspirin 81 mg chewable tablet, Chew 81 mg daily, Disp: , Rfl:   •  atropine (ISOPTO ATROPINE) 1 % ophthalmic solution, Administer 1 drop into the left eye in the morning, Disp: , Rfl:   •  finasteride (PROSCAR) 5 mg tablet, TAKE 1 TABLET (5 MG TOTAL) BY MOUTH DAILY BPH, Disp: 90 tablet, Rfl: 3  •  levothyroxine 50 mcg tablet, Take 75 mcg by mouth daily , Disp: , Rfl:   •  metoprolol tartrate (LOPRESSOR) 50 mg tablet, Take 50 mg by mouth every 12 (twelve) hours, Disp: , Rfl:   •  multivitamin-iron-minerals-folic acid (CENTRUM) chewable tablet, Chew 1 tablet daily, Disp: , Rfl:   •  prednisoLONE acetate (PRED FORTE) 1 % ophthalmic suspension, Administer 1 drop into the left eye in the morning, Disp: , Rfl:   •  tamsulosin (FLOMAX) 0 4 mg, TAKE 1 CAPSULE BY MOUTH EVERY DAY WITH DINNER, Disp: 90 capsule, Rfl: 3  •  torsemide (DEMADEX) 10 mg tablet, Take 10 mg by mouth daily, Disp: , Rfl:      Please note:  Voice-recognition software may have been used in the preparation of this document  Occasional wrong word or "sound-alike" substitutions may have occurred due to the inherent limitations of voice recognition software  Interpretation should be guided by context           [address] [place] [address] Avenue, CRNP  12/21/2022  3:18 PM

## 2022-12-21 NOTE — ASSESSMENT & PLAN NOTE
· BPH history  · Continue tamsulosin 0 4 milligrams daily  · Continue finasteride 5 milligrams daily  · Monitor for S/S of urinary retention

## 2022-12-21 NOTE — ASSESSMENT & PLAN NOTE
· History of hypertension  · BP stable, 122/70  · Continue amlodipine 5 mg daily  · Continue metoprolol 50 mg b i d   · Encourage MUSHTAQ diet

## 2022-12-21 NOTE — ASSESSMENT & PLAN NOTE
· TSH on 09/16/2022 was 2 95  · Recheck TSH in March of 2023  · Continue levothyroxine 75 micrograms daily

## 2023-02-09 ENCOUNTER — NURSING HOME VISIT (OUTPATIENT)
Dept: GERIATRICS | Facility: OTHER | Age: 88
End: 2023-02-09

## 2023-02-09 VITALS
OXYGEN SATURATION: 96 % | SYSTOLIC BLOOD PRESSURE: 132 MMHG | RESPIRATION RATE: 20 BRPM | DIASTOLIC BLOOD PRESSURE: 70 MMHG | TEMPERATURE: 97.7 F | BODY MASS INDEX: 31.7 KG/M2 | HEART RATE: 76 BPM | WEIGHT: 190.5 LBS

## 2023-02-09 DIAGNOSIS — I50.32 CHRONIC DIASTOLIC (CONGESTIVE) HEART FAILURE (HCC): Primary | ICD-10-CM

## 2023-02-09 NOTE — PROGRESS NOTES
[address]  [address] [facility]   [address] [facility] [address]  49     Progress Note  Code [facility] 32    Patient Location     [facility]    Reason for visit     F U Hypertension, CAD, hypothyroidism, BPH, CHF    Patient’s care was coordinated with nursing facility staff  Recent vitals, labs and updated medications were reviewed on [employer] system of facility  Problem List Items Addressed This Visit        Cardiovascular and Mediastinum    Chronic diastolic (congestive) heart failure (HCC) - Primary     Wt Readings from Last 3 Encounters:   02/09/23 86 4 kg (190 lb 8 oz)   09/09/22 82 8 kg (182 lb 8 oz)   03/29/22 83 5 kg (184 lb)         Pt is noted to have rales on today''s exam with increase edema of LE  Weights have been stable at around 190 pounds during last few weekshowever compare to last 3 months pt has gained around 6 pounds  Will give  extra dose of Torsemide 20 mg along with KCL 20 meq x 3 days and follow  Continue maintenance dose of torsemide 20 mg daily                HTN:  Blood pressure stable on metoprolol 50 mg twice daily, torsemide 20 mg daily and amlodipine 5 mg daily     3) CAD:  Stable, No signs or symptoms cont metoprolol 50 mg 1 tab po BID  And aspirin 81 mg 1 tab po daily      Hypothyroidism:  TSH was normal at 2 95 on 9/16/2022  Cont levothyroxine 75 mcg 1 tab po qam        BPH:  Cont Finasteride Tablet 5 MG and Tamsulosin 0 1 mg daily    Alzhiemer's dementia:  No behavioral issues  Continue supportive treatment    • Provide redirection, reorientation, distraction techniques  • Fall Precautions  • Assist with ADLs/IADLs  • Avoid deliriogenic medications such as tramadol, benzodiazepines, anticholinergics, benadryl  • Encourage Hydration/ Nutrition  • Implement sleep hygiene, limit night time interuptions   • Encourage participation in group activities when appropriate         HPI       Patient is being seen for a follow-up visit today  He is doing okay at present  No aute issues have been reported by the staff  Patient is awake alert pleasant able to make his needs known, ambulates in a wheelchair  No recent falls  No behavioral issues  Pt has chronic edema of lower extremities for which he  uses compression wraps  Weights have been stable at around 190 pounds during the last 2 months  Review of Systems   Constitutional: Negative for chills, fever and unexpected weight change  Respiratory: Negative for cough, shortness of breath and wheezing  Cardiovascular: Positive for leg swelling  Negative for chest pain  Gastrointestinal: Negative for abdominal distention, abdominal pain and vomiting  Genitourinary: Negative for flank pain and hematuria  Musculoskeletal: Positive for gait problem  Neurological: Negative for seizures and syncope  Psychiatric/Behavioral: Negative for agitation and behavioral problems         Past Medical History:   Diagnosis Date   • Anxiety    • Bilateral lower leg cellulitis 9/22/2021   • BPH with obstruction/lower urinary tract symptoms    • CAD (coronary artery disease)    • Colon polyp    • Glaucoma    • Gross hematuria    • History of thrombolytic therapy    • Hypertension    • Hypothyroidism    • Major depressive disorder    • Osteoarthritis    • Paraphimosis    • Peptic ulcer disease    • SIRS of non-infectious origin w/o acute organ dysfunction (HCC)    • Skin cancer (melanoma) ([facility] [address] )    • SOB (shortness of breath)    • Stroke [facility])    • Uncircumcised male        Past Surgical History:   Procedure Laterality Date   • CYSTOSCOPY  05/27/2016   • TRANSURETHRAL RESECTION OF PROSTATE         Social History     Tobacco Use   Smoking Status Former   Smokeless Tobacco Never       No Known Allergies      Current Outpatient Medications:   •  amLODIPine (NORVASC) 5 mg tablet, Take 5 mg by mouth daily, Disp: , Rfl:   •  aspirin 81 mg chewable tablet, Chew 81 mg daily, Disp: , Rfl:   •  atropine (ISOPTO ATROPINE) 1 % ophthalmic solution, Administer 1 drop into the left eye in the morning, Disp: , Rfl:   •  finasteride (PROSCAR) 5 mg tablet, TAKE 1 TABLET (5 MG TOTAL) BY MOUTH DAILY BPH, Disp: 90 tablet, Rfl: 3  •  levothyroxine 50 mcg tablet, Take 75 mcg by mouth daily , Disp: , Rfl:   •  metoprolol tartrate (LOPRESSOR) 50 mg tablet, Take 50 mg by mouth every 12 (twelve) hours, Disp: , Rfl:   •  multivitamin-iron-minerals-folic acid (CENTRUM) chewable tablet, Chew 1 tablet daily, Disp: , Rfl:   •  prednisoLONE acetate (PRED FORTE) 1 % ophthalmic suspension, Administer 1 drop into the left eye in the morning, Disp: , Rfl:   •  tamsulosin (FLOMAX) 0 4 mg, TAKE 1 CAPSULE BY MOUTH EVERY DAY WITH DINNER, Disp: 90 capsule, Rfl: 3  •  torsemide (DEMADEX) 10 mg tablet, Take 10 mg by mouth daily, Disp: , Rfl:     Updated list was reviewed in [facility] of facility  Vitals:    02/09/23 0839   BP: 132/70   Pulse: 76   Resp: 20   Temp: 97 7 °F (36 5 °C)   SpO2: 96%       Physical Exam  HENT:      Head: Normocephalic and atraumatic  Eyes:      Comments: Opacity left eye   Cardiovascular:      Rate and Rhythm: Normal rate and regular rhythm  Heart sounds: Murmur heard  Pulmonary:      Breath sounds: Rales (involving b/l lower third) present  No wheezing or rhonchi  Abdominal:      General: There is no distension  Palpations: Abdomen is soft  Tenderness: There is no abdominal tenderness  There is no guarding  Musculoskeletal:      Cervical back: Neck supple  Right lower leg: Edema present  Left lower leg: Edema present  Skin:     Coloration: Skin is not jaundiced  Neurological:      General: No focal deficit present  Cranial Nerves: No cranial nerve deficit  Comments: Oriented in month and year   Does not remember the president   Psychiatric:         Mood and Affect: Mood normal          Behavior: Behavior normal          Diagnostic Data:      9/16/23    TSH 2 95  3/2/22  HGB 12 2, WBC 6 1, Plt 168     BUN 23, creatinine 0 94, sodium 142, potassium 3 7    Labs are due on 3/15/2023    Will follow    This note was electronically signed by Dr Dillon Garay

## 2023-02-09 NOTE — ASSESSMENT & PLAN NOTE
Wt Readings from Last 3 Encounters:   02/09/23 86 4 kg (190 lb 8 oz)   09/09/22 82 8 kg (182 lb 8 oz)   03/29/22 83 5 kg (184 lb)         Pt is noted to have rales on today''s exam with increase edema of LE  Weights have been stable at around 190 pounds during last few weekshowever compare to last 3 months pt has gained around 6 pounds  Will give  extra dose of Torsemide 20 mg along with KCL 20 meq x 3 days and follow    Continue maintenance dose of torsemide 20 mg daily

## 2023-02-13 NOTE — TELEPHONE ENCOUNTER
Spoke to son, pt was seen in [facility], simpson placed for retention, failed one voiding trial  Now at [facility]  Appt 3/12 to be evaluated  Last seen 7/2018  no...

## 2023-03-22 ENCOUNTER — NURSING HOME VISIT (OUTPATIENT)
Dept: GERIATRICS | Facility: OTHER | Age: 88
End: 2023-03-22

## 2023-03-22 DIAGNOSIS — G30.1 LATE ONSET ALZHEIMER DEMENTIA, UNSPECIFIED DEMENTIA SEVERITY, UNSPECIFIED WHETHER BEHAVIORAL, PSYCHOTIC, OR MOOD DISTURBANCE OR ANXIETY (HCC): Primary | ICD-10-CM

## 2023-03-22 DIAGNOSIS — I10 ESSENTIAL HYPERTENSION: ICD-10-CM

## 2023-03-22 DIAGNOSIS — R26.2 AMBULATORY DYSFUNCTION: ICD-10-CM

## 2023-03-22 DIAGNOSIS — I50.32 CHRONIC DIASTOLIC (CONGESTIVE) HEART FAILURE (HCC): ICD-10-CM

## 2023-03-22 DIAGNOSIS — N40.0 BENIGN PROSTATIC HYPERPLASIA, UNSPECIFIED WHETHER LOWER URINARY TRACT SYMPTOMS PRESENT: ICD-10-CM

## 2023-03-22 DIAGNOSIS — F02.80 LATE ONSET ALZHEIMER DEMENTIA, UNSPECIFIED DEMENTIA SEVERITY, UNSPECIFIED WHETHER BEHAVIORAL, PSYCHOTIC, OR MOOD DISTURBANCE OR ANXIETY (HCC): Primary | ICD-10-CM

## 2023-03-22 DIAGNOSIS — E03.9 HYPOTHYROIDISM, UNSPECIFIED TYPE: ICD-10-CM

## 2023-03-22 NOTE — PROGRESS NOTES
[address]  [phone]    NAME: Ronny Baker  AGE: 80 y o  SEX: male    Progress Note    Location: [facility]  POS: 28 (LT)    Assessment/Plan:    Late onset Alzheimer dementia ([address] )  Continue 24/7 LTCF supportive care  Weight stable  Ave meal completion: %   NO anemia (3/16/2023)  Renal functions WNL (3/16/2023)  TSH WNL (3/16/2023)  Continue Centrum (for men) daily  Continue Melatonin 5mg at HS (sleep)    Chronic diastolic (congestive) heart failure (HCC)  Wt Readings from Last 3 Encounters:   02/09/23 86 4 kg (190 lb 8 oz)   09/09/22 82 8 kg (182 lb 8 oz)   03/29/22 83 5 kg (184 lb)   Euvolemic on assessment  Patient reported baseline cardiopulmonary symptoms  Weight stable  Weight: 190 5 lbs (3/9/2023) <= 190 5 lbs (2/1/2023) <= 191 0 lbs (1/3/2023)  Continue CHF protocol and monthly weight check  Continue the following meds:  * Torsemide 20mg daily  * Metoprolol tartrate 50mg BID  * Amlodipine 5mg daily  Continue MUSHTAQ, regular texture with thin liquids  Renal functions WNL (3/16/2023)    Essential hypertension  BP and HR stable and controlled  BP range (3/1- 21/2023) = 118/64 to 152/86  ** 2 episodes of SBP > 137 on this period  Continue Metoprolol tartrate 50mg daily/  Amlodipine 5mg daily    BPH (benign prostatic hyperplasia)  Patient denies any new or worsening symptoms  Continue Tamsulosin 0 4mg daily + Finasteride 5mg daily    Hypothyroidism  TSH level: 2 54 (WNL: 3/16/2023) <= 3 08 (WNL: 3/23/2022)  Continue Levothyroxine 75mcg daily    Ambulatory dysfunction  Fall precaution      Chief complaint / Reason for visit: Follow- visit    History of Present Illness: This is a 41-year-old male patient admitted at [facility] for dementia    Patient is seen and examined today follow-up acute chronic medical conditions: Chronic Diastolic CHF, HTN, CAD, Hypothyroidism, Ambulatory dysfunction      Patient is out of bed this visit sitting in manual wheelchair in room - alert, cooperative, calm, pleasant and not in distress  Patient is verbal with clear coherent speech - oriented to name/birthday/current month only  Patient acknowledged feeling well on this visit, stated, " I'm doing pretty well" - denies any acute medical concerns during ROS assessment including pain  Per nursing, no acute medical concerns for this visit - described as stable and at baseline  Nursing and prior provider notes reviewed on this visit  Discussed visit with PCP and nursing staff/ supervisor  Review of Systems:  Per history of present illness, all other systems reviewed and negative  HISTORY:  Medical Hx: Reviewed, unchanged  Family Hx: Reviewed, unchanged  Soc Hx: Reviewed,  unchanged    ALLERGY: Reviewed, unchanged  No Known Allergies     PHYSICAL EXAM:  Vital Signs: T 97 7F -P82 -R16 BP: 136/72 SpO2: 96% RA  Weight: 190 5 lbs (3/9/2023) <= 190 5 lbs (2/1/2023) <= 191 0 lbs (1/3/2023)    General: NAD  Well appearing  No acute distress  Head: Atraumatic  Normocephalic  Ear: [place]  Eye Exam: anicteric sclera, no discharge, PERRLA, No injection  Wears glasses  Left eye opacity  Oral Exam: moist mucous membranes, no buccaloropharyngeal erythema, palatine tonsils WNL  Shania Muir Multiple missing teeth  Neck Exam: no anterior cervical lymphadenopathy noted, neck supple  Trachea midline, no carotid bruit, no masses  Cardiovascular: regular rate, irregular rhythm, no murmurs, rubs, or gallops  S1 and S2  Pulmonary: no wheeze, no rhonchi, no rales  No chest tenderness  Normal chest wall expansion  Abdominal: soft, non-tender, nondistended, bowel sounds audible x 4 quadrants  No palpable hepatosplenomegaly, no tympany  Ave meal completion: %  : Non distended bladder  Continent  Daily BM  Extremities and skin: B/L LE edema +1, no rashes  Intact skin  Neurological: alert, cooperative and responsive, Oriented x 2   Cranial Nerves II-XII grossly intact, no tics, normal sensation to pressure and light touch   moving all 4 extremities symmetrically  Ambulatory dysfunction - uses manual wheelchair  Laboratory results / Imaging reviewed: Hard copy/ies in medical chart:    * Lipid testing (3/20/2023) = WNL except:  Tri (H)    * CBC wo diff (3/16/2023) = WNL    * BMP (3/16/2023) = WNL except:  Ca: 8 3 (L)  EGFR: not calculated  * TSH (3/16/2023) = 2 64 (WNL)    Current Medications: All medications reviewed and updated in Nursing Home Chart    Please note: This note was completed in part utilizing a voice-recognition software may have been used in the preparation of this document  Grammatical errors, random word insertion, spelling mistakes, and incomplete sentences may be an occasional consequence of the system secondary to software limitations, ambient noise and hardware issues  Occasional wrong word or "sound-alike" substitutions may have occurred due to the inherent limitations of voice recognition software  At the time of dictation, efforts were made to edit, clarify and/or correct errors  Interpretation should be guided by context  Please read the chart carefully and recognize, using context, where substitutions have occurred  If you have any questions or concerns about the context, text or information contained within the body of this dictation, please contact myself, the provider, for further clarification        AZALEA Herman  3/23/2023

## 2023-03-24 NOTE — ASSESSMENT & PLAN NOTE
Patient denies any new or worsening symptoms  Continue Tamsulosin 0 4mg daily + Finasteride 5mg daily

## 2023-03-24 NOTE — ASSESSMENT & PLAN NOTE
BP and HR stable and controlled    BP range (3/1- 21/2023) = 118/64 to 152/86  ** 2 episodes of SBP > 137 on this period  Continue Metoprolol tartrate 50mg daily/  Amlodipine 5mg daily

## 2023-03-24 NOTE — ASSESSMENT & PLAN NOTE
Continue 24/7 LTCF supportive care  Weight stable   Marylu Kumar meal completion: %   NO anemia (3/16/2023)  Renal functions WNL (3/16/2023)  TSH WNL (3/16/2023)  Continue Centrum (for men) daily  Continue Melatonin 5mg at HS (sleep)

## 2023-03-24 NOTE — ASSESSMENT & PLAN NOTE
Wt Readings from Last 3 Encounters:   02/09/23 86 4 kg (190 lb 8 oz)   09/09/22 82 8 kg (182 lb 8 oz)   03/29/22 83 5 kg (184 lb)   Euvolemic on assessment  Patient reported baseline cardiopulmonary symptoms  Weight stable     Weight: 190 5 lbs (3/9/2023) <= 190 5 lbs (2/1/2023) <= 191 0 lbs (1/3/2023)  Continue CHF protocol and monthly weight check  Continue the following meds:  * Torsemide 20mg daily  * Metoprolol tartrate 50mg BID  * Amlodipine 5mg daily  Continue MUSHTAQ, regular texture with thin liquids  Renal functions WNL (3/16/2023)

## 2023-05-01 ENCOUNTER — NURSING HOME VISIT (OUTPATIENT)
Dept: GERIATRICS | Facility: OTHER | Age: 88
End: 2023-05-01

## 2023-05-01 DIAGNOSIS — N40.0 BENIGN PROSTATIC HYPERPLASIA, UNSPECIFIED WHETHER LOWER URINARY TRACT SYMPTOMS PRESENT: ICD-10-CM

## 2023-05-01 DIAGNOSIS — G30.1 LATE ONSET ALZHEIMER DEMENTIA, UNSPECIFIED DEMENTIA SEVERITY, UNSPECIFIED WHETHER BEHAVIORAL, PSYCHOTIC, OR MOOD DISTURBANCE OR ANXIETY (HCC): ICD-10-CM

## 2023-05-01 DIAGNOSIS — I50.32 CHRONIC DIASTOLIC (CONGESTIVE) HEART FAILURE (HCC): ICD-10-CM

## 2023-05-01 DIAGNOSIS — E03.9 HYPOTHYROIDISM, UNSPECIFIED TYPE: ICD-10-CM

## 2023-05-01 DIAGNOSIS — I25.10 CORONARY ARTERY DISEASE INVOLVING NATIVE HEART WITHOUT ANGINA PECTORIS, UNSPECIFIED VESSEL OR LESION TYPE: ICD-10-CM

## 2023-05-01 DIAGNOSIS — I10 ESSENTIAL HYPERTENSION: Primary | ICD-10-CM

## 2023-05-01 DIAGNOSIS — F02.80 LATE ONSET ALZHEIMER DEMENTIA, UNSPECIFIED DEMENTIA SEVERITY, UNSPECIFIED WHETHER BEHAVIORAL, PSYCHOTIC, OR MOOD DISTURBANCE OR ANXIETY (HCC): ICD-10-CM

## 2023-05-01 NOTE — ASSESSMENT & PLAN NOTE
Wt Readings from Last 3 Encounters:   02/09/23 86 4 kg (190 lb 8 oz)   09/09/22 82 8 kg (182 lb 8 oz)   03/29/22 83 5 kg (184 lb)     Euvolemic on exam, continue torsemide 20 mg daily

## 2023-05-01 NOTE — ASSESSMENT & PLAN NOTE
Continue 24/7 LTCF supportive care  Weight stable     Continue Centrum (for men) daily  Continue Melatonin 5mg at HS (sleep)

## 2023-05-01 NOTE — ASSESSMENT & PLAN NOTE
Remains asymptomatic on metoprolol tartrate 50 mg BID   Will discontinue aspirin in this elderly frail patient with increased risk of bleeding

## 2023-05-01 NOTE — PROGRESS NOTES
[facility]  [address]  [phone]  [facility]  POS 32      NAME: Sherice Olmedo  AGE: 80 y o  SEX: male [phone]    DATE OF ENCOUNTER: 5/1/2023    Assessment and Plan     1  Essential hypertension  Assessment & Plan:  BP goal< 140/80  Discontinue amlodipine  Continue metoprolol tartrate 50 mg BID       2  Hypothyroidism, unspecified type  Assessment & Plan:  Most recent TSH was 2 5 on 3/16  Continue levothyroxine 75 mcg daily       3  Late onset Alzheimer dementia, unspecified dementia severity, unspecified whether behavioral, psychotic, or mood disturbance or anxiety ([facility] [address] )  Assessment & Plan:  Continue 24/7 LTCF supportive care  Weight stable  Continue Centrum (for men) daily  Continue Melatonin 5mg at HS (sleep)      4  Chronic diastolic (congestive) heart failure (HCC)  Assessment & Plan:  Wt Readings from Last 3 Encounters:   02/09/23 86 4 kg (190 lb 8 oz)   09/09/22 82 8 kg (182 lb 8 oz)   03/29/22 83 5 kg (184 lb)     Euvolemic on exam, continue torsemide 20 mg daily          5  Benign prostatic hyperplasia, unspecified whether lower urinary tract symptoms present  Assessment & Plan:  Patient denies any new or worsening symptoms  Continue Tamsulosin 0 4mg daily + Finasteride 5mg daily      6  Coronary artery disease involving native heart without angina pectoris, unspecified vessel or lesion type  Assessment & Plan:  Remains asymptomatic on metoprolol tartrate 50 mg BID   Will discontinue aspirin in this elderly frail patient with increased risk of bleeding            - Counseling Documentation: patient was counseled regarding: diagnostic results, instructions for management and risk factor reductions    Chief Complaint     Routine Long term follow-up visit  History of Present Illness     Arnold Hill is a 80year old male, he is a LTC resident of [address] [place] [address]   He has a  past medical history including but not limited to late onset Alzheimer dementia, CHF, HTN, GERD, BPH, ambulatory dysfunction seen in collaboration with nursing for LTC follow up  At the time of my exam, patient was oob sitting in w/c  He did not appear in any distress, he is able to make his needs know, has clear coherant speech  He has hx of alzhimer's and is AAOx2 on exam  He denies pain,sob,n/v/d  No other concerns expressed by nursing staff  The following portions of the patient's history were reviewed and updated as appropriate: allergies, current medications, past family history, past medical history, past social history, past surgical history and problem list     Review of Systems     Review of Systems   Constitutional: Negative for chills, diaphoresis, fatigue and fever  HENT: Negative for congestion and dental problem  Eyes: Positive for visual disturbance  Respiratory: Negative for shortness of breath  Gastrointestinal: Negative for abdominal pain, nausea and vomiting  Endocrine: Negative for cold intolerance and heat intolerance  Genitourinary: Negative for difficulty urinating  Musculoskeletal: Positive for arthralgias  Neurological: Negative for dizziness  Psychiatric/Behavioral: Negative for agitation and behavioral problems  Active Problem List     Patient Active Problem List   Diagnosis    Chronic cough    Gastroesophageal reflux disease without esophagitis    CAD (coronary artery disease)    Chronic diastolic (congestive) heart failure (HCC)    Essential hypertension    Late onset Alzheimer dementia ([address] )    BPH (benign prostatic hyperplasia)    Dermatitis associated with incontinence    Ambulatory dysfunction    Seborrheic keratosis    Hypothyroidism       Objective     Vitals:  Pressure 115/60  Temperature 97 1  Pulse 68  Weight 188 pounds  Respiratory rate 18  O2 saturation 95%    Physical Exam  Vitals reviewed  Constitutional:       General: He is not in acute distress  Appearance: He is normal weight   He is not toxic-appearing or diaphoretic  HENT:      Head: Normocephalic and atraumatic  Nose: Nose normal       Mouth/Throat:      Mouth: Mucous membranes are moist    Eyes:      General: No scleral icterus  Cardiovascular:      Rate and Rhythm: Normal rate  Heart sounds: Normal heart sounds  Pulmonary:      Breath sounds: Normal breath sounds  Abdominal:      Palpations: Abdomen is soft  Tenderness: There is no abdominal tenderness  Musculoskeletal:      Right lower leg: Edema present  Left lower leg: Edema present  Skin:     General: Skin is warm  Neurological:      Mental Status: Mental status is at baseline  Psychiatric:         Mood and Affect: Mood normal          Pertinent Laboratory/Diagnostic Studies:  Refer to facility chart  Current Medications   Medications reviewed and updated in facility chart

## 2023-06-01 ENCOUNTER — NURSING HOME VISIT (OUTPATIENT)
Dept: GERIATRICS | Facility: OTHER | Age: 88
End: 2023-06-01

## 2023-06-01 VITALS
HEART RATE: 68 BPM | SYSTOLIC BLOOD PRESSURE: 132 MMHG | DIASTOLIC BLOOD PRESSURE: 66 MMHG | RESPIRATION RATE: 18 BRPM | TEMPERATURE: 97.3 F | BODY MASS INDEX: 31.53 KG/M2 | WEIGHT: 189.5 LBS | OXYGEN SATURATION: 95 %

## 2023-06-01 DIAGNOSIS — E03.9 HYPOTHYROIDISM, UNSPECIFIED TYPE: ICD-10-CM

## 2023-06-01 DIAGNOSIS — R26.2 AMBULATORY DYSFUNCTION: ICD-10-CM

## 2023-06-01 DIAGNOSIS — N40.0 BENIGN PROSTATIC HYPERPLASIA, UNSPECIFIED WHETHER LOWER URINARY TRACT SYMPTOMS PRESENT: ICD-10-CM

## 2023-06-01 DIAGNOSIS — I50.32 CHRONIC DIASTOLIC (CONGESTIVE) HEART FAILURE (HCC): ICD-10-CM

## 2023-06-01 DIAGNOSIS — I10 ESSENTIAL HYPERTENSION: Primary | ICD-10-CM

## 2023-06-01 NOTE — ASSESSMENT & PLAN NOTE
· Denies urinary symptoms  · Continue tamsulosin 0 4 milligrams daily  · Continue finasteride 5 milligrams daily  · Monitor for S/S of urinary retention

## 2023-06-01 NOTE — ASSESSMENT & PLAN NOTE
Multifactorial in the setting of advanced age and chronic medical conditions  Fall/safety Precautions  Ensure adequate nutrition/hydration   Continue 24/7 supportive care per LTC  Assist patient with ADL's/IADLs

## 2023-06-01 NOTE — ASSESSMENT & PLAN NOTE
· BP stable, 132/66  · Continue torsemide 20 mg daily  · Continue metoprolol 50 mg b i d   · Encourage heart healthy low sodium diet

## 2023-06-01 NOTE — ASSESSMENT & PLAN NOTE
Wt Readings from Last 3 Encounters:   06/01/23 86 kg (189 lb 8 oz)   02/09/23 86 4 kg (190 lb 8 oz)   09/09/22 82 8 kg (182 lb 8 oz)     · Weights have been stable  · Appears euvolemic  · Stable chronic b/l LE edema  · Continue to wear compression stockings  · Continue to monitor for weight gain, SOB, increased edema  · Continue metoprolol 50 mg BID  · Continue torsemide 20 mg daily

## 2023-06-01 NOTE — ASSESSMENT & PLAN NOTE
· Mood stable  · Patient is AAOx2, states year is 2021  · Redirect and reorient as needed  · Continue 24/7 supportive care per LTC  · Assist patient with ADL's/IADL's

## 2023-06-01 NOTE — PROGRESS NOTES
[facility]  Gerardo Manley 79  [phone]  [facility]  [facility] (LTC)        NAME: Neela Oswald  AGE: 80 y o  SEX: male CODE STATUS: No CPR    DATE OF ENCOUNTER: 6/1/2023    Assessment and Plan     1  Essential hypertension  Assessment & Plan:  · BP stable, 132/66  · Continue torsemide 20 mg daily  · Continue metoprolol 50 mg b i d   · Encourage heart healthy low sodium diet      2  Hypothyroidism, unspecified type  Assessment & Plan:  · TSH on 3/16 was 2 5  · Continue levothyroxine 75 mcg daily      3  Chronic diastolic (congestive) heart failure (HCC)  Assessment & Plan:  Wt Readings from Last 3 Encounters:   06/01/23 86 kg (189 lb 8 oz)   02/09/23 86 4 kg (190 lb 8 oz)   09/09/22 82 8 kg (182 lb 8 oz)     · Weights have been stable  · Appears euvolemic  · Chronic bilateral lower extremity edema  · Continue to wear compression stockings  · Continue to monitor for weight gain, SOB, increased edema  · Continue torsemide 20 mg daily        4  Benign prostatic hyperplasia, unspecified whether lower urinary tract symptoms present  Assessment & Plan:  · Denies urinary symptoms  · Continue tamsulosin 0 4 milligrams daily  · Continue finasteride 5 milligrams daily  · Monitor for S/S of urinary retention         All medications and routine orders were reviewed and updated as needed  Chief Complaint     LTC follow up visit   Patient's care was coordinated with nursing facility staff  Recent vitals, labs, and updated medications were review on [facility] system in facility    Past Medical and Surgical History      Past Medical History:   Diagnosis Date   • Anxiety    • Bilateral lower leg cellulitis 9/22/2021   • BPH with obstruction/lower urinary tract symptoms    • CAD (coronary artery disease)    • Colon polyp    • Glaucoma    • Gross hematuria    • History of thrombolytic therapy    • Hypertension    • Hypothyroidism    • Major depressive disorder    • Osteoarthritis    • Paraphimosis    • Peptic ulcer disease    • SIRS of non-infectious origin w/o acute organ dysfunction (HCC)    • Skin cancer (melanoma) (HCC)    • SOB (shortness of breath)    • Stroke [facility])    • Uncircumcised male      Past Surgical History:   Procedure Laterality Date   • CYSTOSCOPY  05/27/2016   • TRANSURETHRAL RESECTION OF PROSTATE       No Known Allergies       History of Present Illness     HPI  Tess Nunez is a 61-year-old male, he is a LTC resident of [facility] since 8/31/21  Past Medical Hx including but not limited to CAD, BPH, HTN, hypothyroidism, CHF, and ambulatory dysfunction was seen in collaboration with nursing for medical management and LTC follow up  Macho Sabillon was seen and examined today and appears to be doing well  On exam he is sitting in his wc and does not appear to be in distress  He is alert to self and month, states the year is 2021  He denies pain, sleep disturbance, and has a good appetite  He has no concerns at this time  Recent lab work stable  He denies any CP/SOB/N/V/D  Denies lightheadedness, dizziness, headaches, vision changes  Patient states he is eating well and staying hydrated  Denies any bowel or bladder issues  Per review of SNF records, Patient is eating 3 meals per day, consuming %, with supplemental nutritional shakes  Last documented BM 5/31/23  No concerns from nursing at this time  The patient's allergies, past medical, surgical, social and family history were reviewed and unchanged  Review of Systems     Review of Systems   Constitutional: Positive for activity change  Negative for appetite change, chills and fever  Uses wheelchair baseline   HENT: Negative  Negative for congestion  Eyes: Positive for visual disturbance  Glaucoma   Respiratory: Negative  Negative for cough, shortness of breath and wheezing  Gastrointestinal: Negative    Negative for abdominal distention, abdominal pain, constipation, diarrhea, nausea and vomiting  Endocrine: Negative  Genitourinary: Negative for difficulty urinating  Musculoskeletal: Positive for gait problem  Skin: Negative  Allergic/Immunologic: Negative  Neurological: Positive for weakness  Negative for dizziness, light-headedness and headaches  Hematological: Negative  Psychiatric/Behavioral: Negative  Negative for confusion and sleep disturbance  Objective     Vitals:   Vitals:    06/01/23 1007   BP: 132/66   Pulse: 68   Resp: 18   Temp: (!) 97 3 °F (36 3 °C)   SpO2: 95%         Physical Exam  Vitals and nursing note reviewed  Constitutional:       General: He is not in acute distress  Appearance: Normal appearance  He is not ill-appearing  HENT:      Head: Normocephalic and atraumatic  Nose: No congestion  Mouth/Throat:      Mouth: Mucous membranes are moist    Eyes:      Conjunctiva/sclera: Conjunctivae normal    Cardiovascular:      Rate and Rhythm: Normal rate and regular rhythm  Pulses: Normal pulses  Heart sounds: Normal heart sounds  Pulmonary:      Effort: Pulmonary effort is normal       Breath sounds: Normal breath sounds  Abdominal:      General: Bowel sounds are normal  There is no distension  Palpations: Abdomen is soft  Tenderness: There is no abdominal tenderness  Musculoskeletal:      Right lower leg: Edema present  Left lower leg: Edema present  Comments: Chronic bilateral lower extremity edema, wears compression stockings   Skin:     General: Skin is warm  Capillary Refill: Capillary refill takes more than 3 seconds  Neurological:      Mental Status: He is alert and oriented to person, place, and time  Motor: Weakness present        Gait: Gait abnormal    Psychiatric:         Mood and Affect: Mood normal          Behavior: Behavior normal          Pertinent Laboratory/Diagnostic Studies:   Reviewed in facility chart-stable      Current Medications   Medications reviewed and "updated see facility [facility] for details  Current Outpatient Medications:   •  atropine (ISOPTO ATROPINE) 1 % ophthalmic solution, Administer 1 drop into the left eye in the morning, Disp: , Rfl:   •  finasteride (PROSCAR) 5 mg tablet, TAKE 1 TABLET (5 MG TOTAL) BY MOUTH DAILY BPH, Disp: 90 tablet, Rfl: 3  •  levothyroxine 50 mcg tablet, Take 75 mcg by mouth daily , Disp: , Rfl:   •  metoprolol tartrate (LOPRESSOR) 50 mg tablet, Take 50 mg by mouth every 12 (twelve) hours, Disp: , Rfl:   •  multivitamin-iron-minerals-folic acid (CENTRUM) chewable tablet, Chew 1 tablet daily, Disp: , Rfl:   •  prednisoLONE acetate (PRED FORTE) 1 % ophthalmic suspension, Administer 1 drop into the left eye in the morning, Disp: , Rfl:   •  tamsulosin (FLOMAX) 0 4 mg, TAKE 1 CAPSULE BY MOUTH EVERY DAY WITH DINNER, Disp: 90 capsule, Rfl: 3  •  torsemide (DEMADEX) 10 mg tablet, Take 10 mg by mouth daily, Disp: , Rfl:      Please note:  Voice-recognition software may have been used in the preparation of this document  Occasional wrong word or \"sound-alike\" substitutions may have occurred due to the inherent limitations of voice recognition software  Interpretation should be guided by context           [address], CRNP  6/1/2023  10:14 AM    "

## 2023-07-13 ENCOUNTER — NURSING HOME VISIT (OUTPATIENT)
Dept: GERIATRICS | Facility: OTHER | Age: 88
End: 2023-07-13
Payer: COMMERCIAL

## 2023-07-13 DIAGNOSIS — I25.10 CORONARY ARTERY DISEASE INVOLVING NATIVE HEART WITHOUT ANGINA PECTORIS, UNSPECIFIED VESSEL OR LESION TYPE: Primary | ICD-10-CM

## 2023-07-13 DIAGNOSIS — F02.80 LATE ONSET ALZHEIMER'S DEMENTIA WITHOUT BEHAVIORAL DISTURBANCE (HCC): ICD-10-CM

## 2023-07-13 DIAGNOSIS — E03.9 HYPOTHYROIDISM, UNSPECIFIED TYPE: ICD-10-CM

## 2023-07-13 DIAGNOSIS — G30.1 LATE ONSET ALZHEIMER'S DEMENTIA WITHOUT BEHAVIORAL DISTURBANCE (HCC): ICD-10-CM

## 2023-07-13 DIAGNOSIS — N40.1 BENIGN PROSTATIC HYPERPLASIA WITH LOWER URINARY TRACT SYMPTOMS, SYMPTOM DETAILS UNSPECIFIED: ICD-10-CM

## 2023-07-13 DIAGNOSIS — I50.9 CONGESTIVE HEART FAILURE, UNSPECIFIED HF CHRONICITY, UNSPECIFIED HEART FAILURE TYPE (HCC): ICD-10-CM

## 2023-07-13 DIAGNOSIS — I10 ESSENTIAL HYPERTENSION: ICD-10-CM

## 2023-07-13 PROCEDURE — 99309 SBSQ NF CARE MODERATE MDM 30: CPT | Performed by: FAMILY MEDICINE

## 2023-07-13 NOTE — PROGRESS NOTES
[address]  [phone]  [facility]  POS 32      NAME: Manasa Cee  AGE: 80 y.o. SEX: male [id number]    DATE OF ENCOUNTER: 7/13/2023    Assessment and Plan     Diagnoses and all orders for this visit:    Coronary artery disease involving native heart without angina pectoris, unspecified vessel or lesion type    Congestive heart failure, unspecified HF chronicity, unspecified heart failure type ([address])    Essential hypertension    Hypothyroidism, unspecified type    Late onset Alzheimer's dementia without behavioral disturbance ([address])      Coronary artery disease: clinically stable. Continue metoprolol. CHF: noted for trace edema at bilateral lower extremities. Lungs CTABL. Patient denies any cardiopulmonary symptoms. Continue metoprolol and torsemide at current dosages. HTN: BP within acceptable ranges. Continue routine monitoring. Hypothyroidism: TSH (3/2023) 2.64. Appears clinically stable. Continue levothyroxine at current dosage. Repeat TSH in 2-3 months. Dementia: without disruptive behavior. Maintain structured environment and appropriate sleep-wake cycle. Chief Complaint     Routine Long term follow-up visit. History of Present Illness     Lucero Smart is a 45-year-old male, he is a LTC resident of [facility] since 8/31/21. Past Medical Hx including but not limited to CAD, BPH, HTN, hypothyroidism, CHF, and ambulatory dysfunction. Reports no acute issues at this time; states that he is in good spirits and remains well adjusted to the unit. The following portions of the patient's history were reviewed and updated as appropriate: allergies, current medications, past family history, past medical history, past social history, past surgical history and problem list.    Review of Systems     Review of Systems   Constitutional: Negative for chills and fever. HENT: Negative for ear pain and sore throat.     Eyes: Negative for pain and visual disturbance. Respiratory: Negative for cough and shortness of breath. Cardiovascular: Negative for chest pain and palpitations. Gastrointestinal: Negative for abdominal pain and vomiting. Genitourinary: Negative for dysuria and hematuria. Musculoskeletal: Negative for arthralgias and back pain. Skin: Negative for color change and rash. Neurological: Negative for seizures, syncope and headaches. Psychiatric/Behavioral: Negative for agitation and behavioral problems. All other systems reviewed and are negative. Active Problem List     Patient Active Problem List   Diagnosis   • Chronic cough   • Gastroesophageal reflux disease without esophagitis   • CAD (coronary artery disease)   • Chronic diastolic (congestive) heart failure (HCC)   • Essential hypertension   • Late onset Alzheimer dementia (HCC)   • BPH (benign prostatic hyperplasia)   • Dermatitis associated with incontinence   • Ambulatory dysfunction   • Seborrheic keratosis   • Hypothyroidism       Objective     Vitals:    Physical Exam  Vitals reviewed. Constitutional:       Appearance: Normal appearance. HENT:      Head: Normocephalic and atraumatic. Mouth/Throat:      Mouth: Mucous membranes are moist.   Eyes:      Extraocular Movements: Extraocular movements intact. Cardiovascular:      Rate and Rhythm: Normal rate and regular rhythm. Heart sounds: S1 normal and S2 normal.   Pulmonary:      Effort: Pulmonary effort is normal.      Breath sounds: Normal breath sounds. Abdominal:      Palpations: Abdomen is soft. Musculoskeletal:         General: Normal range of motion. Cervical back: Neck supple. Right lower leg: No edema. Left lower leg: No edema. Skin:     General: Skin is warm and dry. Neurological:      Mental Status: He is alert. Mental status is at baseline.    Psychiatric:         Mood and Affect: Mood normal.         Behavior: Behavior normal.         Pertinent Laboratory/Diagnostic Studies:  Refer to facility chart. Current Medications   Medications reviewed and updated in facility chart.

## 2023-09-08 ENCOUNTER — NURSING HOME VISIT (OUTPATIENT)
Dept: GERIATRICS | Facility: OTHER | Age: 88
End: 2023-09-08
Payer: COMMERCIAL

## 2023-09-08 VITALS
OXYGEN SATURATION: 95 % | DIASTOLIC BLOOD PRESSURE: 68 MMHG | HEART RATE: 65 BPM | SYSTOLIC BLOOD PRESSURE: 127 MMHG | WEIGHT: 182 LBS | RESPIRATION RATE: 18 BRPM | BODY MASS INDEX: 30.29 KG/M2 | TEMPERATURE: 97.5 F

## 2023-09-08 DIAGNOSIS — E03.9 HYPOTHYROIDISM, UNSPECIFIED TYPE: ICD-10-CM

## 2023-09-08 DIAGNOSIS — F02.80 LATE ONSET ALZHEIMER'S DEMENTIA WITHOUT BEHAVIORAL DISTURBANCE (HCC): ICD-10-CM

## 2023-09-08 DIAGNOSIS — R26.2 AMBULATORY DYSFUNCTION: Primary | ICD-10-CM

## 2023-09-08 DIAGNOSIS — G30.1 LATE ONSET ALZHEIMER'S DEMENTIA WITHOUT BEHAVIORAL DISTURBANCE (HCC): ICD-10-CM

## 2023-09-08 DIAGNOSIS — I50.32 CHRONIC DIASTOLIC (CONGESTIVE) HEART FAILURE (HCC): ICD-10-CM

## 2023-09-08 DIAGNOSIS — N40.0 BENIGN PROSTATIC HYPERPLASIA, UNSPECIFIED WHETHER LOWER URINARY TRACT SYMPTOMS PRESENT: ICD-10-CM

## 2023-09-08 PROCEDURE — 99309 SBSQ NF CARE MODERATE MDM 30: CPT | Performed by: NURSE PRACTITIONER

## 2023-09-08 NOTE — PROGRESS NOTES
[address]  [phone]  FACILITY: [facility]  Code 32 (LTC)        NAME: Erich Muñoz  AGE: 80 y.o. SEX: male CODE STATUS: No CPR    DATE OF ENCOUNTER: 9/8/2023    Assessment and Plan     1. Ambulatory dysfunction  Assessment & Plan:  · Multifactorial in the setting of advanced age, Alzheimer's dementia, multiple chronic comorbidities  · Continue 24/7 support at long-term care  · Assist with ADLs/IADLs as needed  · Patient is mostly wheelchair-bound able to transfer with 1 assist mostly mobile in wheelchair  · Continue fall and safety precautions      2. Hypothyroidism, unspecified type  Assessment & Plan:  · TSH 2.1 in July 2023  · Continue levothyroxine 75 mcg daily  · Repeat TSH every 6 months to 1 year while stable      3. Late onset Alzheimer's dementia without behavioral disturbance ([address])  Assessment & Plan:  · Stable  · AAO x1-2  · Pleasant and cooperative, clear speech, follows directions/commands  Provide redirection, reorientation, distraction techniques  Continue fall precautions at facility  Continue to assist with ADLs at facility   Avoid deliriogenic medications such as tramadol, benzodiazepines, anticholinergics, Benadryl  Encourage Hydration/ Nutrition  Implement sleep hygiene, limit night time interuptions,   Encourage participation in group activities      4. Chronic diastolic (congestive) heart failure (HCC)  Assessment & Plan:  Wt Readings from Last 3 Encounters:   09/08/23 82.6 kg (182 lb)   06/01/23 86 kg (189 lb 8 oz)   02/09/23 86.4 kg (190 lb 8 oz)     · Weights are stable  · Appears euvolemic on exam-chronic bilateral lower extremity edema mild nonpitting on exam-wearing YOHANNES stockings  · Continue torsemide 20 mg daily  · Renal function stable with creatinine 0.9  · Continue beta-blocker  · Continue no added salt diet  · Monitor weights          5.  Benign prostatic hyperplasia, unspecified whether lower urinary tract symptoms present  Assessment & Plan:  · Denies any urinary complaints/symptoms on exam  · Nursing staff have no concerns at this time  · Continue tamsulosin 0.4 mg daily  · Continue finasteride 5 mg daily         All medications and routine orders were reviewed and updated as needed. Chief Complaint     LTC follow up visit     Past Medical and Surgica History      Past Medical History:   Diagnosis Date   • Anxiety    • Bilateral lower leg cellulitis 9/22/2021   • BPH with obstruction/lower urinary tract symptoms    • CAD (coronary artery disease)    • Colon polyp    • Glaucoma    • Gross hematuria    • History of thrombolytic therapy    • Hypertension    • Hypothyroidism    • Major depressive disorder    • Osteoarthritis    • Paraphimosis    • Peptic ulcer disease    • SIRS of non-infectious origin w/o acute organ dysfunction (HCC)    • Skin cancer (melanoma) ([address] [facility])    • SOB (shortness of breath)    • Stroke [facility])    • Uncircumcised male      Past Surgical History:   Procedure Laterality Date   • CYSTOSCOPY  05/27/2016   • TRANSURETHRAL RESECTION OF PROSTATE       No Known Allergies       History of Present Illness     Tawny John is a 80 y.o. male, he is a LTC resident of [facility]. Patient has a past medical Hx including but not limited to Hypothyroidism, CAD, CHF, HTN, Alzheimer's dementia, BPH, ambulatory dysfunction . Patient is seen in collaboration with nursing for medical mgmt and STR follow up. Seen and examined at bedside today. Patient  is a poor historian due to dementia. Patient is out of bed in wheelchair watching TV. Patient does not appear in any distress. Patient's speech is clear and coherent, he is confused at baseline, able to make his needs known. Patient denies any pain on exam.  Patient offers no complaints, states he is doing well. Spoke with nursing staff they have no concerns at this time states he is at his baseline.   Patient is also followed by [facility] CRNP.                  The patient's allergies, past medical, surgical, social and family history were reviewed and unchanged. Review of Systems     Review of Systems   Unable to perform ROS: Dementia         Objective     Vitals:   Vitals:    09/08/23 1220   BP: 127/68   Pulse: 65   Resp: 18   Temp: 97.5 °F (36.4 °C)   SpO2: 95%         Physical Exam  Vitals and nursing note reviewed. Constitutional:       General: He is not in acute distress. Appearance: Normal appearance. HENT:      Head: Normocephalic and atraumatic. Nose: No congestion or rhinorrhea. Mouth/Throat:      Mouth: Mucous membranes are moist.   Eyes:      General: No scleral icterus. Extraocular Movements: Extraocular movements intact. Conjunctiva/sclera: Conjunctivae normal.      Pupils: Pupils are equal, round, and reactive to light. Cardiovascular:      Rate and Rhythm: Normal rate and regular rhythm. Pulses: Normal pulses. Heart sounds: Normal heart sounds. No murmur heard. Pulmonary:      Effort: Pulmonary effort is normal.      Breath sounds: Normal breath sounds. No wheezing, rhonchi or rales. Abdominal:      General: Bowel sounds are normal. There is no distension. Palpations: Abdomen is soft. Tenderness: There is no abdominal tenderness. Musculoskeletal:         General: No swelling or signs of injury. Right lower leg: Edema (mild nonpitting) present. Left lower leg: Edema (mild nonpitting) present. Lymphadenopathy:      Cervical: No cervical adenopathy. Skin:     General: Skin is warm and dry. Findings: No erythema. Neurological:      Mental Status: He is alert. Mental status is at baseline. He is disoriented. GCS: GCS eye subscore is 4. GCS verbal subscore is 4. GCS motor subscore is 6. Sensory: No sensory deficit. Motor: Weakness present.       Gait: Gait abnormal.   Psychiatric:         Mood and Affect: Mood normal.         Behavior: Behavior normal.         Pertinent Laboratory/Diagnostic Studies:     Reviewed in facility chart      Current Medications   Medications reviewed and updated see facility [facility] for details. Current Outpatient Medications:   •  atropine (ISOPTO ATROPINE) 1 % ophthalmic solution, Administer 1 drop into the left eye in the morning, Disp: , Rfl:   •  finasteride (PROSCAR) 5 mg tablet, TAKE 1 TABLET (5 MG TOTAL) BY MOUTH DAILY BPH, Disp: 90 tablet, Rfl: 3  •  levothyroxine 50 mcg tablet, Take 75 mcg by mouth daily , Disp: , Rfl:   •  metoprolol tartrate (LOPRESSOR) 50 mg tablet, Take 50 mg by mouth every 12 (twelve) hours, Disp: , Rfl:   •  multivitamin-iron-minerals-folic acid (CENTRUM) chewable tablet, Chew 1 tablet daily, Disp: , Rfl:   •  prednisoLONE acetate (PRED FORTE) 1 % ophthalmic suspension, Administer 1 drop into the left eye in the morning, Disp: , Rfl:   •  tamsulosin (FLOMAX) 0.4 mg, TAKE 1 CAPSULE BY MOUTH EVERY DAY WITH DINNER, Disp: 90 capsule, Rfl: 3  •  torsemide (DEMADEX) 10 mg tablet, Take 10 mg by mouth daily, Disp: , Rfl:      Please note:  Voice-recognition software may have been used in the preparation of this document. Occasional wrong word or "sound-alike" substitutions may have occurred due to the inherent limitations of voice recognition software. Interpretation should be guided by context.          AZALEA Sheffield  9/8/2023  12:22 PM

## 2023-09-12 NOTE — ASSESSMENT & PLAN NOTE
Wt Readings from Last 3 Encounters:   09/08/23 82.6 kg (182 lb)   06/01/23 86 kg (189 lb 8 oz)   02/09/23 86.4 kg (190 lb 8 oz)     · Weights are stable  · Appears euvolemic on exam-chronic bilateral lower extremity edema mild nonpitting on exam-wearing YOHANNES stockings  · Continue torsemide 20 mg daily  · Renal function stable with creatinine 0.9  · Continue beta-blocker  · Continue no added salt diet  · Monitor weights

## 2023-09-12 NOTE — ASSESSMENT & PLAN NOTE
· TSH 2.1 in July 2023  · Continue levothyroxine 75 mcg daily  · Repeat TSH every 6 months to 1 year while stable

## 2023-09-12 NOTE — ASSESSMENT & PLAN NOTE
· Stable  · AAO x1-2  · Pleasant and cooperative, clear speech, follows directions/commands  Provide redirection, reorientation, distraction techniques  Continue fall precautions at facility  Continue to assist with ADLs at facility   Avoid deliriogenic medications such as tramadol, benzodiazepines, anticholinergics, Benadryl  Encourage Hydration/ Nutrition  Implement sleep hygiene, limit night time interuptions,   Encourage participation in group activities

## 2023-09-12 NOTE — ASSESSMENT & PLAN NOTE
· Denies any urinary complaints/symptoms on exam  · Nursing staff have no concerns at this time  · Continue tamsulosin 0.4 mg daily  · Continue finasteride 5 mg daily

## 2023-09-12 NOTE — ASSESSMENT & PLAN NOTE
· Multifactorial in the setting of advanced age, Alzheimer's dementia, multiple chronic comorbidities  · Continue 24/7 support at long-term care  · Assist with ADLs/IADLs as needed  · Patient is mostly wheelchair-bound able to transfer with 1 assist mostly mobile in wheelchair  · Continue fall and safety precautions

## 2023-11-03 ENCOUNTER — NURSING HOME VISIT (OUTPATIENT)
Dept: GERIATRICS | Facility: OTHER | Age: 88
End: 2023-11-03
Payer: COMMERCIAL

## 2023-11-03 DIAGNOSIS — F02.80 LATE ONSET ALZHEIMER'S DEMENTIA WITHOUT BEHAVIORAL DISTURBANCE (HCC): ICD-10-CM

## 2023-11-03 DIAGNOSIS — I50.32 CHRONIC DIASTOLIC (CONGESTIVE) HEART FAILURE (HCC): ICD-10-CM

## 2023-11-03 DIAGNOSIS — N40.0 BENIGN PROSTATIC HYPERPLASIA, UNSPECIFIED WHETHER LOWER URINARY TRACT SYMPTOMS PRESENT: ICD-10-CM

## 2023-11-03 DIAGNOSIS — G30.1 LATE ONSET ALZHEIMER'S DEMENTIA WITHOUT BEHAVIORAL DISTURBANCE (HCC): ICD-10-CM

## 2023-11-03 DIAGNOSIS — R26.2 AMBULATORY DYSFUNCTION: Primary | ICD-10-CM

## 2023-11-03 DIAGNOSIS — E03.9 HYPOTHYROIDISM, UNSPECIFIED TYPE: ICD-10-CM

## 2023-11-03 PROCEDURE — 99309 SBSQ NF CARE MODERATE MDM 30: CPT | Performed by: FAMILY MEDICINE

## 2023-11-03 NOTE — PROGRESS NOTES
[address]  [phone]  [facility]  [facility]      NAME: Karsten Garcia  AGE: 80 y.o. SEX: male [phone]    DATE OF ENCOUNTER: 11/19/2023    Assessment and Plan     1. Ambulatory dysfunction  - Fall precautions in place  - uses walker and wheelchair    2. Benign prostatic hyperplasia, unspecified whether lower urinary tract symptoms present  - cont Finasteride 5 mg po qd  - cont Tamsulosin 0.4 mg po qd    3. Chronic diastolic (congestive) heart failure (HCC)  - cont Metoprolol tartrate 50 mg po bid  - cont Torsemide 10 mg po qd    4. Hypothyroidism, unspecified type  - cont Levothyroxine 75 mcg po qam  - TSH : 7/26: 2.0    5. Late onset Alzheimer's dementia without behavioral disturbance ([address])  - redirection, reorientation  - assistance with ADLs    He is DNR/DNI.    - Counseling Documentation: patient was counseled regarding: prognosis    Chief Complaint     Routine Long term follow-up visit. History of Present Illness     HPI  Fortunato John, a 81 y/o male is a long term resident at [facility], seen and examined, stable. He found sitting next to bed and watching TV. He is able to answer some questions. He denies any c/o at this time. Staff have no concerns at this time. He needs mod assistance with ADLs. He uses walker. Staff denies any recent falls or hospitalizations. The following portions of the patient's history were reviewed and updated as appropriate: allergies, current medications, past family history, past medical history, past social history, past surgical history and problem list.    Review of Systems     Review of Systems   Unable to perform ROS: Dementia   As in HPI.     Active Problem List     Patient Active Problem List   Diagnosis   • Chronic cough   • Gastroesophageal reflux disease without esophagitis   • Coronary artery disease involving native heart without angina pectoris   • Chronic diastolic (congestive) heart failure ([address])   • Essential hypertension   • Late onset Alzheimer's dementia without behavioral disturbance (HCC)   • BPH (benign prostatic hyperplasia)   • Dermatitis associated with incontinence   • Ambulatory dysfunction   • Seborrheic keratosis   • Hypothyroidism   • Congestive heart failure (HCC)       Objective     Vitals: T: 97.2, P: 62, R: 16, BP: 140/74, 96% on RA, Wt: 182.5 lbs    Physical Exam  Vitals and nursing note reviewed. Constitutional:       General: He is not in acute distress. Appearance: Normal appearance. He is well-developed. He is not diaphoretic. Comments: Looks younger for suggested age   HENT:      Head: Normocephalic and atraumatic. Nose: Nose normal.      Mouth/Throat:      Mouth: Mucous membranes are moist.      Pharynx: Oropharynx is clear. No oropharyngeal exudate. Eyes:      General: No scleral icterus. Right eye: No discharge. Left eye: No discharge. Extraocular Movements: Extraocular movements intact. Conjunctiva/sclera: Conjunctivae normal.   Cardiovascular:      Rate and Rhythm: Normal rate and regular rhythm. Heart sounds: Normal heart sounds. No murmur heard. Pulmonary:      Effort: Pulmonary effort is normal. No respiratory distress. Breath sounds: Normal breath sounds. No wheezing. Chest:      Chest wall: No tenderness. Abdominal:      General: Bowel sounds are normal. There is no distension. Palpations: Abdomen is soft. Tenderness: There is no abdominal tenderness. There is no guarding. Musculoskeletal:         General: No tenderness or deformity. Normal range of motion. Cervical back: Normal range of motion. Comments: Trace LE edema   Skin:     General: Skin is warm and dry. Neurological:      Mental Status: He is alert. Cranial Nerves: No cranial nerve deficit. Motor: No abnormal muscle tone.       Coordination: Coordination normal.      Comments: Oriented to self  Verbal  Able to follow simple commands Psychiatric:         Mood and Affect: Mood normal.         Behavior: Behavior normal.      Comments: Intermittent confusion       Pertinent Laboratory/Diagnostic Studies:  Refer to facility chart. Current Medications   Medications reviewed and updated in facility chart.

## 2023-12-29 ENCOUNTER — NURSING HOME VISIT (OUTPATIENT)
Dept: GERIATRICS | Facility: OTHER | Age: 88
End: 2023-12-29

## 2023-12-29 VITALS
SYSTOLIC BLOOD PRESSURE: 134 MMHG | OXYGEN SATURATION: 96 % | HEART RATE: 72 BPM | BODY MASS INDEX: 30.7 KG/M2 | TEMPERATURE: 97.3 F | RESPIRATION RATE: 18 BRPM | WEIGHT: 184.5 LBS | DIASTOLIC BLOOD PRESSURE: 68 MMHG

## 2023-12-29 DIAGNOSIS — G30.1 LATE ONSET ALZHEIMER'S DEMENTIA WITHOUT BEHAVIORAL DISTURBANCE (HCC): ICD-10-CM

## 2023-12-29 DIAGNOSIS — F02.80 LATE ONSET ALZHEIMER'S DEMENTIA WITHOUT BEHAVIORAL DISTURBANCE (HCC): ICD-10-CM

## 2023-12-29 DIAGNOSIS — I50.32 CHRONIC DIASTOLIC (CONGESTIVE) HEART FAILURE (HCC): ICD-10-CM

## 2023-12-29 DIAGNOSIS — R26.2 AMBULATORY DYSFUNCTION: ICD-10-CM

## 2023-12-29 DIAGNOSIS — N40.0 BENIGN PROSTATIC HYPERPLASIA, UNSPECIFIED WHETHER LOWER URINARY TRACT SYMPTOMS PRESENT: ICD-10-CM

## 2023-12-29 DIAGNOSIS — I10 ESSENTIAL HYPERTENSION: Primary | ICD-10-CM

## 2023-12-29 DIAGNOSIS — E03.9 HYPOTHYROIDISM, UNSPECIFIED TYPE: ICD-10-CM

## 2023-12-29 RX ORDER — CHOLECALCIFEROL (VITAMIN D3) 125 MCG
5 CAPSULE ORAL
COMMUNITY

## 2023-12-29 RX ORDER — LEVOTHYROXINE SODIUM 0.07 MG/1
75 TABLET ORAL DAILY
COMMUNITY

## 2023-12-29 RX ORDER — TORSEMIDE 20 MG/1
20 TABLET ORAL DAILY
COMMUNITY

## 2023-12-29 RX ORDER — METOPROLOL SUCCINATE 100 MG/1
100 TABLET, EXTENDED RELEASE ORAL DAILY
COMMUNITY

## 2023-12-29 NOTE — ASSESSMENT & PLAN NOTE
BP stable, 134/68  Continue torsemide 20 mg daily  Continue metoprolol  mg daily  Encourage heart healthy low sodium diet

## 2023-12-29 NOTE — ASSESSMENT & PLAN NOTE
Mood stable  Patient is AAOx2, states age is 89  Redirect and reorient as needed  Continue 24/7 supportive care per LTC  Assist patient with ADL's/IADL's   Encourage OOB for meals and participation in group activities

## 2023-12-29 NOTE — PROGRESS NOTES
[facility]  [address] [address] [phone]  [facility]  32 (LTC)        NAME: Chito Brown  AGE: 89 y.o. SEX: male CODE STATUS: No CPR    DATE OF ENCOUNTER: 12/29/2023    Assessment and Plan     1. Essential hypertension  Assessment & Plan:  BP stable, 134/68  Continue torsemide 20 mg daily  Continue metoprolol  mg daily  Encourage heart healthy low sodium diet      2. Chronic diastolic (congestive) heart failure (HCC)  Assessment & Plan:  Wt Readings from Last 3 Encounters:   09/08/23 82.6 kg (182 lb)   06/01/23 86 kg (189 lb 8 oz)   02/09/23 86.4 kg (190 lb 8 oz)     Weights have been stable  Appears euvolemic  Stable chronic b/l LE edema  Continue to wear compression stockings  Continue to monitor for weight gain, SOB, increased edema  Continue metoprolol  mg daily  Continue torsemide 20 mg daily        3. Hypothyroidism, unspecified type  Assessment & Plan:  TSH on 7/26/23 was 2.01  Continue levothyroxine 75 mcg daily  Repeat TSH with yearly labs 3/2024      4. Late onset Alzheimer's dementia without behavioral disturbance (HCC)  Assessment & Plan:  Mood stable  Patient is AAOx2, states age is 89  Redirect and reorient as needed  Continue 24/7 supportive care per LTC  Assist patient with ADL's/IADL's   Encourage OOB for meals and participation in group activities      5. Ambulatory dysfunction  Assessment & Plan:  Multifactorial in the setting of advanced age and chronic medical conditions  Fall/safety Precautions  Ensure adequate nutrition/hydration   Continue 24/7 supportive care per LTC  Assist patient with ADL's/IADLs      6. Benign prostatic hyperplasia, unspecified whether lower urinary tract symptoms present  Assessment & Plan:  Denies urinary symptoms  Continue Tamsulosin  0.4 mg daily  Continue Finasteride 5 mg daily           All medications and routine orders were reviewed and updated as needed.    Chief Complaint     LTC follow up visit    Patient's care was coordinated with nursing facility staff. Recent vitals, labs, and updated medications were review on [facility] system in facility.  Past Medical and Surgical History      Past Medical History:   Diagnosis Date    Anxiety     Bilateral lower leg cellulitis 9/22/2021    BPH with obstruction/lower urinary tract symptoms     CAD (coronary artery disease)     Colon polyp     Glaucoma     Gross hematuria     History of thrombolytic therapy     Hypertension     Hypothyroidism     Major depressive disorder     Osteoarthritis     Paraphimosis     Peptic ulcer disease     SIRS of non-infectious origin w/o acute organ dysfunction (HCC)     Skin cancer (melanoma) (HCC)     SOB (shortness of breath)     Stroke (HCC)     Uncircumcised male      Past Surgical History:   Procedure Laterality Date    CYSTOSCOPY  05/27/2016    TRANSURETHRAL RESECTION OF PROSTATE       No Known Allergies       History of Present Illness     HPI  Chito Brown is a 89-year-old male, he is a LTC resident of [facility] since 8/31/21. Past Medical Hx including but not limited to CAD, BPH, HTN, hypothyroidism, CHF, and ambulatory dysfunction was seen in collaboration with nursing for medical management and LTC follow up.     Chito was seen and examined today and he offers no complaints. On exam he is sitting in his wc and does not appear to be in distress. He is alert to self and month, states his age is 89.  He denies pain, sleep disturbance, and has a good appetite. Continue to assist with ADL's/IADL's. He denies any CP/SOB/N/V/D. Denies lightheadedness, dizziness, headaches, vision changes. Patient states he is eating well and staying hydrated. Denies any bowel or bladder issues. Per review of SNF records, Patient is eating 3 meals per day, consuming %, with supplemental nutritional shakes. Last documented BM 12/28/23. No concerns from nursing at this time.    The patient's allergies, past medical,  surgical, social and family history were reviewed and unchanged.    Review of Systems     Review of Systems   Constitutional:  Positive for activity change. Negative for appetite change, chills and fever.        Uses wheelchair baseline   HENT: Negative.  Negative for congestion.    Eyes:  Positive for visual disturbance.        Glaucoma   Respiratory: Negative.  Negative for cough, shortness of breath and wheezing.    Gastrointestinal: Negative.  Negative for abdominal distention, abdominal pain, constipation, diarrhea, nausea and vomiting.   Endocrine: Negative.    Genitourinary:  Negative for difficulty urinating.   Musculoskeletal:  Positive for gait problem.   Skin: Negative.    Allergic/Immunologic: Negative.    Neurological:  Positive for weakness. Negative for dizziness, light-headedness and headaches.   Hematological: Negative.    Psychiatric/Behavioral: Negative.  Negative for confusion and sleep disturbance.          Objective     Vitals:   Vitals:    12/29/23 1100   BP: 134/68   Pulse: 72   Resp: 18   Temp: (!) 97.3 °F (36.3 °C)   SpO2: 96%           Physical Exam  Vitals and nursing note reviewed.   Constitutional:       General: He is not in acute distress.     Appearance: Normal appearance. He is not ill-appearing.   HENT:      Head: Normocephalic and atraumatic.      Nose: No congestion.      Mouth/Throat:      Mouth: Mucous membranes are moist.   Eyes:      Conjunctiva/sclera: Conjunctivae normal.   Cardiovascular:      Rate and Rhythm: Normal rate and regular rhythm.      Pulses: Normal pulses.      Heart sounds: Normal heart sounds.   Pulmonary:      Effort: Pulmonary effort is normal.      Breath sounds: Normal breath sounds.   Abdominal:      General: Bowel sounds are normal. There is no distension.      Palpations: Abdomen is soft.      Tenderness: There is no abdominal tenderness.   Musculoskeletal:      Right lower leg: Edema present.      Left lower leg: Edema present.      Comments: Chronic  "bilateral lower extremity edema, wears compression stockings   Skin:     General: Skin is warm.      Capillary Refill: Capillary refill takes more than 3 seconds.   Neurological:      Mental Status: He is alert and oriented to person, place, and time.      Motor: Weakness present.      Gait: Gait abnormal.   Psychiatric:         Mood and Affect: Mood normal.         Behavior: Behavior normal.         Pertinent Laboratory/Diagnostic Studies:   Reviewed in facility chart-stable      Current Medications   Medications reviewed and updated see facility MAR for details.      Current Outpatient Medications:     atropine (ISOPTO ATROPINE) 1 % ophthalmic solution, Administer 1 drop into the left eye in the morning, Disp: , Rfl:     finasteride (PROSCAR) 5 mg tablet, TAKE 1 TABLET (5 MG TOTAL) BY MOUTH DAILY BPH, Disp: 90 tablet, Rfl: 3    levothyroxine 75 mcg tablet, Take 75 mcg by mouth daily, Disp: , Rfl:     Melatonin 5 MG TABS, Take 5 mg by mouth daily at bedtime, Disp: , Rfl:     metoprolol succinate (TOPROL-XL) 100 mg 24 hr tablet, Take 100 mg by mouth daily, Disp: , Rfl:     multivitamin-iron-minerals-folic acid (CENTRUM) chewable tablet, Chew 1 tablet daily, Disp: , Rfl:     prednisoLONE acetate (PRED FORTE) 1 % ophthalmic suspension, Administer 1 drop into the left eye in the morning, Disp: , Rfl:     tamsulosin (FLOMAX) 0.4 mg, TAKE 1 CAPSULE BY MOUTH EVERY DAY WITH DINNER, Disp: 90 capsule, Rfl: 3    torsemide (DEMADEX) 20 mg tablet, Take 20 mg by mouth daily, Disp: , Rfl:      Please note:  Voice-recognition software may have been used in the preparation of this document.  Occasional wrong word or \"sound-alike\" substitutions may have occurred due to the inherent limitations of voice recognition software.  Interpretation should be guided by context.         AZALEA Nava  12/29/2023  5:44 PM    "

## 2023-12-29 NOTE — ASSESSMENT & PLAN NOTE
Wt Readings from Last 3 Encounters:   09/08/23 82.6 kg (182 lb)   06/01/23 86 kg (189 lb 8 oz)   02/09/23 86.4 kg (190 lb 8 oz)     Weights have been stable  Appears euvolemic  Stable chronic b/l LE edema  Continue to wear compression stockings  Continue to monitor for weight gain, SOB, increased edema  Continue metoprolol  mg daily  Continue torsemide 20 mg daily

## 2024-02-16 ENCOUNTER — NURSING HOME VISIT (OUTPATIENT)
Dept: GERIATRICS | Facility: OTHER | Age: 89
End: 2024-02-16
Payer: COMMERCIAL

## 2024-02-16 DIAGNOSIS — I50.32 CHRONIC DIASTOLIC (CONGESTIVE) HEART FAILURE (HCC): ICD-10-CM

## 2024-02-16 DIAGNOSIS — E03.9 HYPOTHYROIDISM, UNSPECIFIED TYPE: ICD-10-CM

## 2024-02-16 DIAGNOSIS — R26.2 AMBULATORY DYSFUNCTION: ICD-10-CM

## 2024-02-16 DIAGNOSIS — G30.1 LATE ONSET ALZHEIMER'S DEMENTIA WITHOUT BEHAVIORAL DISTURBANCE (HCC): Primary | ICD-10-CM

## 2024-02-16 DIAGNOSIS — I10 ESSENTIAL HYPERTENSION: ICD-10-CM

## 2024-02-16 DIAGNOSIS — F02.80 LATE ONSET ALZHEIMER'S DEMENTIA WITHOUT BEHAVIORAL DISTURBANCE (HCC): Primary | ICD-10-CM

## 2024-02-16 DIAGNOSIS — N40.0 BENIGN PROSTATIC HYPERPLASIA, UNSPECIFIED WHETHER LOWER URINARY TRACT SYMPTOMS PRESENT: ICD-10-CM

## 2024-02-16 PROBLEM — K21.9 GASTROESOPHAGEAL REFLUX DISEASE WITHOUT ESOPHAGITIS: Status: RESOLVED | Noted: 2021-09-01 | Resolved: 2024-02-16

## 2024-02-16 PROCEDURE — 99309 SBSQ NF CARE MODERATE MDM 30: CPT | Performed by: FAMILY MEDICINE

## 2024-02-16 NOTE — PROGRESS NOTES
[facility]  [address] [address] [phone]  [facility]  POS 32      NAME: Chito Brown  AGE: 89 y.o. SEX: male [phone]    DATE OF ENCOUNTER: 2/16/2024    Assessment and Plan     1. Late onset Alzheimer's dementia without behavioral disturbance (HCC)  Assessment & Plan:  Redirect and reorient as needed  Continue 24/7 supportive care per LTC  Assist patient with ADL's/IADL's   Encourage OOB for meals and participation in group activities      2. Ambulatory dysfunction  Assessment & Plan:  Multifactorial in the setting of advanced age and chronic medical conditions  Fall/safety Precautions  Ensure adequate nutrition/hydration   Continue 24/7 supportive care per LTC  Assist patient with ADL's/IADLs      3. Essential hypertension  Assessment & Plan:  BP stable, 141/71  Continue torsemide 20 mg daily  Continue metoprolol  mg daily  Encourage heart healthy low sodium diet      4. Chronic diastolic (congestive) heart failure (HCC)  Assessment & Plan:  Wt Readings from Last 3 Encounters:   12/29/23 83.7 kg (184 lb 8 oz)   09/08/23 82.6 kg (182 lb)   06/01/23 86 kg (189 lb 8 oz)     Weights have been stable  Appears euvolemic  Stable chronic b/l LE edema  Continue to wear compression stockings  Continue to monitor for weight gain, SOB, increased edema  Continue metoprolol  mg daily  Continue torsemide 20 mg daily      5. Hypothyroidism, unspecified type  Assessment & Plan:  TSH on 7/26/23 was 2.01  Continue levothyroxine 75 mcg daily  Repeat TSH with yearly labs 3/2024      6. Benign prostatic hyperplasia, unspecified whether lower urinary tract symptoms present  Assessment & Plan:    Continue Tamsulosin  0.4 mg daily  Continue Finasteride 5 mg daily        - Counseling Documentation: patient was counseled regarding: instructions for management    Chief Complaint     Routine Long term follow-up visit.    History of Present Illness     Patient was seen and examined by bedside  today. No acute complaints. Denies changes in appetite/BM habits, chest pain, abdominal pain, palpitations, or sleep disturbances. He has chronic leg swelling that is stable and using compression socks.      The following portions of the patient's history were reviewed and updated as appropriate: allergies, current medications, past family history, past medical history, past social history, past surgical history and problem list.    Review of Systems     Review of Systems   Constitutional:  Negative for chills and fever.   HENT:  Negative for ear pain and sore throat.    Eyes:  Negative for pain and visual disturbance.   Respiratory:  Negative for cough and shortness of breath.    Cardiovascular:  Negative for chest pain and palpitations.   Gastrointestinal:  Negative for abdominal pain and vomiting.   Genitourinary:  Negative for dysuria and hematuria.   Musculoskeletal:  Negative for arthralgias and back pain.   Skin:  Negative for color change and rash.   Neurological:  Negative for seizures and syncope.   All other systems reviewed and are negative.      Active Problem List     Patient Active Problem List   Diagnosis    Chronic cough    Coronary artery disease involving native heart without angina pectoris    Chronic diastolic (congestive) heart failure (HCC)    Essential hypertension    Late onset Alzheimer's dementia without behavioral disturbance (HCC)    BPH (benign prostatic hyperplasia)    Dermatitis associated with incontinence    Ambulatory dysfunction    Seborrheic keratosis    Hypothyroidism    Congestive heart failure (HCC)       Objective     Vitals: Temp 97.3 F; Pulse 59; RR 16; SpO2 96%; /71; weight 186 lbs    Physical Exam  Vitals reviewed.   Constitutional:       General: He is not in acute distress.     Appearance: Normal appearance. He is not ill-appearing.   HENT:      Head: Normocephalic and atraumatic.      Mouth/Throat:      Mouth: Mucous membranes are moist.   Cardiovascular:       Rate and Rhythm: Normal rate and regular rhythm.      Pulses: Normal pulses.      Heart sounds: Normal heart sounds. No murmur heard.  Pulmonary:      Effort: Pulmonary effort is normal. No respiratory distress.      Breath sounds: Normal breath sounds. No wheezing.   Abdominal:      General: Bowel sounds are normal.      Palpations: Abdomen is soft.      Tenderness: There is no abdominal tenderness.   Musculoskeletal:      Right lower leg: Edema present.      Left lower leg: Edema present.   Skin:     General: Skin is warm and dry.   Neurological:      General: No focal deficit present.      Mental Status: He is alert and oriented to person, place, and time.   Psychiatric:         Mood and Affect: Mood normal.         Behavior: Behavior normal.         Pertinent Laboratory/Diagnostic Studies:  Refer to facility chart.    Current Medications   Medications reviewed and updated in facility chart.

## 2024-02-16 NOTE — ASSESSMENT & PLAN NOTE
Wt Readings from Last 3 Encounters:   12/29/23 83.7 kg (184 lb 8 oz)   09/08/23 82.6 kg (182 lb)   06/01/23 86 kg (189 lb 8 oz)     Weights have been stable  Appears euvolemic  Stable chronic b/l LE edema  Continue to wear compression stockings  Continue to monitor for weight gain, SOB, increased edema  Continue metoprolol  mg daily  Continue torsemide 20 mg daily

## 2024-02-16 NOTE — ASSESSMENT & PLAN NOTE
BP stable, 141/71  Continue torsemide 20 mg daily  Continue metoprolol  mg daily  Encourage heart healthy low sodium diet

## 2024-02-16 NOTE — ASSESSMENT & PLAN NOTE
Redirect and reorient as needed  Continue 24/7 supportive care per LTC  Assist patient with ADL's/IADL's   Encourage OOB for meals and participation in group activities

## 2024-04-16 ENCOUNTER — NURSING HOME VISIT (OUTPATIENT)
Dept: GERIATRICS | Facility: OTHER | Age: 89
End: 2024-04-16
Payer: COMMERCIAL

## 2024-04-16 VITALS
WEIGHT: 191 LBS | RESPIRATION RATE: 18 BRPM | SYSTOLIC BLOOD PRESSURE: 110 MMHG | TEMPERATURE: 97.4 F | HEART RATE: 72 BPM | OXYGEN SATURATION: 95 % | DIASTOLIC BLOOD PRESSURE: 72 MMHG | BODY MASS INDEX: 31.78 KG/M2

## 2024-04-16 DIAGNOSIS — E03.9 HYPOTHYROIDISM, UNSPECIFIED TYPE: ICD-10-CM

## 2024-04-16 DIAGNOSIS — G30.1 LATE ONSET ALZHEIMER'S DEMENTIA WITHOUT BEHAVIORAL DISTURBANCE (HCC): ICD-10-CM

## 2024-04-16 DIAGNOSIS — F02.80 LATE ONSET ALZHEIMER'S DEMENTIA WITHOUT BEHAVIORAL DISTURBANCE (HCC): ICD-10-CM

## 2024-04-16 DIAGNOSIS — I50.32 CHRONIC DIASTOLIC (CONGESTIVE) HEART FAILURE (HCC): Primary | ICD-10-CM

## 2024-04-16 DIAGNOSIS — I10 ESSENTIAL HYPERTENSION: ICD-10-CM

## 2024-04-16 DIAGNOSIS — E55.9 VITAMIN D DEFICIENCY: ICD-10-CM

## 2024-04-16 PROCEDURE — 99309 SBSQ NF CARE MODERATE MDM 30: CPT

## 2024-04-16 NOTE — ASSESSMENT & PLAN NOTE
BP stable, 110/72  Continue torsemide 20 mg daily  Continue metoprolol  mg daily  Encourage heart healthy low sodium diet

## 2024-04-16 NOTE — ASSESSMENT & PLAN NOTE
Wt Readings from Last 3 Encounters:   04/16/24 86.6 kg (191 lb)   12/29/23 83.7 kg (184 lb 8 oz)   09/08/23 82.6 kg (182 lb)     Weights have been stable  Appears euvolemic  Stable chronic b/l LE edema  Continue to wear compression stockings  Continue to monitor for weight gain, SOB, increased edema  Continue metoprolol  mg daily  Continue torsemide 20 mg daily

## 2024-04-16 NOTE — PROGRESS NOTES
[facility]  [address] [id number] [phone]  [facility]  32 (LTC)        NAME: Chito Brown  AGE: 89 y.o. SEX: male CODE STATUS: No CPR    DATE OF ENCOUNTER: 4/16/2024    Assessment and Plan     1. Chronic diastolic (congestive) heart failure (HCC)  Assessment & Plan:  Wt Readings from Last 3 Encounters:   04/16/24 86.6 kg (191 lb)   12/29/23 83.7 kg (184 lb 8 oz)   09/08/23 82.6 kg (182 lb)     Weights have been stable  Appears euvolemic  Stable chronic b/l LE edema  Continue to wear compression stockings  Continue to monitor for weight gain, SOB, increased edema  Continue metoprolol  mg daily  Continue torsemide 20 mg daily        2. Essential hypertension  Assessment & Plan:  BP stable, 110/72  Continue torsemide 20 mg daily  Continue metoprolol  mg daily  Encourage heart healthy low sodium diet      3. Hypothyroidism, unspecified type  Assessment & Plan:  TSH on 4/4/24 was 3.76  Continue levothyroxine 75 mcg daily      4. Late onset Alzheimer's dementia without behavioral disturbance (HCC)  Assessment & Plan:  Mood stable  Patient is AAOx2  Provide redirection, reorientation, distraction techniques  Fall Precautions  Assist with ADLs/IADLs  Avoid deliriogenic medications such as tramadol, benzodiazepines, anticholinergics, benadryl  Encourage Hydration/ Nutrition  Implement sleep hygiene, limit night time interuptions   Encourage participation in group activities when appropriate         5. Vitamin D deficiency  Assessment & Plan:  4/4/24 Vitamin D level 14  Started on Vit D 3 66258 units weekly on mondays           All medications and routine orders were reviewed and updated as needed.    Chief Complaint     [facility] follow up visit   Patient's care was coordinated with nursing facility staff. Recent vitals, labs, and updated medications were review on [facility] system in facility.  Past Medical and Surgical History      Past Medical History:   Diagnosis  Date    Anxiety     Bilateral lower leg cellulitis 9/22/2021    BPH with obstruction/lower urinary tract symptoms     CAD (coronary artery disease)     Colon polyp     Glaucoma     Gross hematuria     History of thrombolytic therapy     Hypertension     Hypothyroidism     Major depressive disorder     Osteoarthritis     Paraphimosis     Peptic ulcer disease     SIRS of non-infectious origin w/o acute organ dysfunction (HCC)     Skin cancer (melanoma) (HCC)     SOB (shortness of breath)     Stroke (HCC)     Uncircumcised male      Past Surgical History:   Procedure Laterality Date    CYSTOSCOPY  05/27/2016    TRANSURETHRAL RESECTION OF PROSTATE       No Known Allergies       History of Present Illness     HPI  Chito Brown is a 89-year-old male, he is a LTC resident of [facility] since 8/31/21. Past Medical Hx including but not limited to CAD, BPH, HTN, hypothyroidism, CHF, and ambulatory dysfunction was seen in collaboration with nursing for medical management and LTC follow up.     Chito was seen and examined today. On exam he is sitting in his wc and does not appear to be in distress. He is alert to self and month, unsure of year.  He denies pain, sleep disturbance, and has a good appetite. He says he has been marycruz and does not have any problems. Requires assistance with ADL's/IADL's. Chronic B/L LE edema, LSC but decreased, 95% on RA.     He denies any CP/SOB/N/V/D. Denies lightheadedness, dizziness, headaches, vision changes. Patient states he is eating well and staying hydrated. Denies any bowel or bladder issues. Per review of SNF records, Patient is eating 3 meals per day, consuming %, with supplemental nutritional shakes. Last documented BM 4/15/24. No concerns from nursing at this time.    The patient's allergies, past medical, surgical, social and family history were reviewed and unchanged.    Review of Systems     Review of Systems   Constitutional:  Positive for activity change.  Negative for appetite change, chills and fever.        Uses wheelchair baseline   HENT: Negative.  Negative for congestion.    Eyes:  Positive for visual disturbance.        Glaucoma   Respiratory: Negative.  Negative for cough, shortness of breath and wheezing.    Cardiovascular:  Positive for leg swelling. Negative for chest pain and palpitations.   Gastrointestinal: Negative.  Negative for abdominal distention, abdominal pain, constipation, diarrhea, nausea and vomiting.   Endocrine: Negative.    Genitourinary:  Negative for difficulty urinating and dysuria.   Musculoskeletal:  Positive for gait problem.   Skin: Negative.    Allergic/Immunologic: Negative.    Neurological:  Positive for weakness. Negative for dizziness, light-headedness and headaches.   Hematological: Negative.    Psychiatric/Behavioral: Negative.  Negative for sleep disturbance.          Objective     Vitals:   Vitals:    04/16/24 1832   BP: 110/72   Pulse: 72   Resp: 18   Temp: (!) 97.4 °F (36.3 °C)   SpO2: 95%             Physical Exam  Vitals and nursing note reviewed.   Constitutional:       General: He is not in acute distress.     Appearance: Normal appearance. He is not ill-appearing.   HENT:      Head: Normocephalic and atraumatic.      Nose: No congestion.   Eyes:      Conjunctiva/sclera: Conjunctivae normal.   Cardiovascular:      Rate and Rhythm: Normal rate and regular rhythm.      Pulses: Normal pulses.      Heart sounds: Normal heart sounds.   Pulmonary:      Effort: Pulmonary effort is normal.      Breath sounds: Normal breath sounds.   Abdominal:      General: Bowel sounds are normal. There is no distension.      Palpations: Abdomen is soft.      Tenderness: There is no abdominal tenderness.   Musculoskeletal:      Right lower leg: Edema present.      Left lower leg: Edema present.      Comments: Chronic bilateral lower extremity edema, wears compression stockings   Skin:     General: Skin is warm.   Neurological:      Mental  "Status: He is alert and oriented to person, place, and time.      Motor: Weakness present.      Gait: Gait abnormal.   Psychiatric:         Mood and Affect: Mood normal.         Behavior: Behavior normal.         Pertinent Laboratory/Diagnostic Studies:   Reviewed in facility chart-stable      Current Medications   Medications reviewed and updated see facility MAR for details.      Current Outpatient Medications:     atropine (ISOPTO ATROPINE) 1 % ophthalmic solution, Administer 1 drop into the left eye in the morning, Disp: , Rfl:     Cholecalciferol 1.25 MG (86154 UT) TABS, Take 50,000 Units by mouth once a week, Disp: , Rfl:     finasteride (PROSCAR) 5 mg tablet, TAKE 1 TABLET (5 MG TOTAL) BY MOUTH DAILY BPH, Disp: 90 tablet, Rfl: 3    levothyroxine 75 mcg tablet, Take 75 mcg by mouth daily, Disp: , Rfl:     Melatonin 5 MG TABS, Take 5 mg by mouth daily at bedtime, Disp: , Rfl:     metoprolol succinate (TOPROL-XL) 100 mg 24 hr tablet, Take 100 mg by mouth daily, Disp: , Rfl:     multivitamin-iron-minerals-folic acid (CENTRUM) chewable tablet, Chew 1 tablet daily, Disp: , Rfl:     prednisoLONE acetate (PRED FORTE) 1 % ophthalmic suspension, Administer 1 drop into the left eye in the morning, Disp: , Rfl:     tamsulosin (FLOMAX) 0.4 mg, TAKE 1 CAPSULE BY MOUTH EVERY DAY WITH DINNER, Disp: 90 capsule, Rfl: 3    torsemide (DEMADEX) 20 mg tablet, Take 20 mg by mouth daily, Disp: , Rfl:      Please note:  Voice-recognition software may have been used in the preparation of this document.  Occasional wrong word or \"sound-alike\" substitutions may have occurred due to the inherent limitations of voice recognition software.  Interpretation should be guided by context.         AZALEA Nava  4/16/2024  6:40 PM    "

## 2024-04-16 NOTE — ASSESSMENT & PLAN NOTE
Mood stable  Patient is AAOx2  Provide redirection, reorientation, distraction techniques  Fall Precautions  Assist with ADLs/IADLs  Avoid deliriogenic medications such as tramadol, benzodiazepines, anticholinergics, benadryl  Encourage Hydration/ Nutrition  Implement sleep hygiene, limit night time interuptions   Encourage participation in group activities when appropriate

## 2024-06-10 ENCOUNTER — NURSING HOME VISIT (OUTPATIENT)
Dept: GERIATRICS | Facility: OTHER | Age: 89
End: 2024-06-10
Payer: COMMERCIAL

## 2024-06-10 DIAGNOSIS — N40.0 BENIGN PROSTATIC HYPERPLASIA, UNSPECIFIED WHETHER LOWER URINARY TRACT SYMPTOMS PRESENT: ICD-10-CM

## 2024-06-10 DIAGNOSIS — E03.9 HYPOTHYROIDISM, UNSPECIFIED TYPE: ICD-10-CM

## 2024-06-10 DIAGNOSIS — I10 ESSENTIAL HYPERTENSION: ICD-10-CM

## 2024-06-10 DIAGNOSIS — R26.2 AMBULATORY DYSFUNCTION: Primary | ICD-10-CM

## 2024-06-10 DIAGNOSIS — I50.32 CHRONIC DIASTOLIC (CONGESTIVE) HEART FAILURE (HCC): ICD-10-CM

## 2024-06-10 PROCEDURE — 99309 SBSQ NF CARE MODERATE MDM 30: CPT | Performed by: FAMILY MEDICINE

## 2024-06-10 NOTE — PROGRESS NOTES
[facility]  [address] [address] [phone]  [facility]  POS [facility]      NAME: Chito Brown  AGE: 89 y.o. SEX: male [phone]    DATE OF ENCOUNTER: 6/22/2024    Assessment and Plan     1. Ambulatory dysfunction  - fall precautions in place  - uses wheelchair    2. Benign prostatic hyperplasia, unspecified whether lower urinary tract symptoms present  - stable  - denies any urinary c/o at this time  - cont Tamsulosin 0.4 mg po qd  - cont Finasteride 5 mg po qd    3. Chronic diastolic (congestive) heart failure (HCC)  - stable  - monitor monthly weights  - cont Torsemide 20 mg po qd  - cont Metoprolol succinate 100 mg po qd    4. Essential hypertension  - controlled  - cont Metoprolol succinate 100 mg po qd    5. Hypothyroidism, unspecified type  - cont levothyroxine 75 mcg po qam  - TSH: 3.7 (4/4/24)    He is DNR/DNI.    - Counseling Documentation: patient was counseled regarding: prognosis    Chief Complaint     Routine Long term follow-up visit.    History of Present Illness     HPI  Chito Brown, a 89 y/o male is a long term resident at [facility], seen and examined, stable. He found sitting infront of TV and watching. He is hard of hearing. He is verbal, able to answer questions well. He offers no c/o at this time. He is eating and sleeping well. He uses wheelchair for mobility, needs mod assistance with ADLs.   Staff have no concerns at this time.    The following portions of the patient's history were reviewed and updated as appropriate: allergies, current medications, past family history, past medical history, past social history, past surgical history and problem list.    Review of Systems     Review of Systems   Constitutional:  Positive for activity change and fatigue.   HENT:  Positive for hearing loss.    Eyes: Negative.    Respiratory: Negative.     Cardiovascular: Negative.    Gastrointestinal: Negative.    Genitourinary: Negative.    Musculoskeletal:  Positive for arthralgias  and gait problem.   Neurological:  Positive for weakness.   Psychiatric/Behavioral: Negative.     All other systems reviewed and are negative.  As in HPI.    Active Problem List     Patient Active Problem List   Diagnosis   • Chronic cough   • Coronary artery disease involving native heart without angina pectoris   • Chronic diastolic (congestive) heart failure (HCC)   • Essential hypertension   • Late onset Alzheimer's dementia without behavioral disturbance (HCC)   • BPH (benign prostatic hyperplasia)   • Dermatitis associated with incontinence   • Ambulatory dysfunction   • Seborrheic keratosis   • Hypothyroidism   • Congestive heart failure (HCC)   • Vitamin D deficiency       Objective     Vitals: T: 98.1, P: 85, R: 16, BP: 150/75, 95% on RA, Wt: 191.5 lbs    Physical Exam  Vitals and nursing note reviewed.   Constitutional:       General: He is not in acute distress.     Appearance: Normal appearance. He is well-developed. He is not diaphoretic.   HENT:      Head: Normocephalic and atraumatic.      Nose: Nose normal.      Mouth/Throat:      Mouth: Oropharynx is clear and moist. Mucous membranes are moist.      Pharynx: Oropharynx is clear. No oropharyngeal exudate.   Eyes:      General: No scleral icterus.        Right eye: No discharge.         Left eye: No discharge.      Extraocular Movements: Extraocular movements intact and EOM normal.      Conjunctiva/sclera: Conjunctivae normal.   Cardiovascular:      Rate and Rhythm: Normal rate and regular rhythm.      Heart sounds: Normal heart sounds. No murmur heard.  Pulmonary:      Effort: Pulmonary effort is normal. No respiratory distress.      Breath sounds: Normal breath sounds. No wheezing.   Chest:      Chest wall: No tenderness.   Abdominal:      General: Bowel sounds are normal. There is no distension.      Palpations: Abdomen is soft.      Tenderness: There is no abdominal tenderness. There is no guarding.   Musculoskeletal:         General: No  tenderness or deformity. Normal range of motion.      Cervical back: Normal range of motion.      Right lower leg: Edema present.      Left lower leg: Edema present.   Skin:     General: Skin is warm and dry.   Neurological:      Mental Status: He is alert and oriented to person, place, and time.      Cranial Nerves: No cranial nerve deficit.      Motor: No abnormal muscle tone.      Coordination: Coordination normal.   Psychiatric:         Mood and Affect: Mood and affect and mood normal.         Behavior: Behavior normal.         Pertinent Laboratory/Diagnostic Studies:  Refer to facility chart.    Current Medications   Medications reviewed and updated in facility chart.

## 2024-07-25 PROBLEM — I11.0 HYPERTENSIVE HEART DISEASE WITH CONGESTIVE HEART FAILURE (HCC): Status: ACTIVE | Noted: 2024-07-25

## 2024-08-09 ENCOUNTER — NURSING HOME VISIT (OUTPATIENT)
Dept: GERIATRICS | Facility: OTHER | Age: 89
End: 2024-08-09
Payer: COMMERCIAL

## 2024-08-09 VITALS
SYSTOLIC BLOOD PRESSURE: 128 MMHG | TEMPERATURE: 97.6 F | RESPIRATION RATE: 18 BRPM | HEART RATE: 58 BPM | DIASTOLIC BLOOD PRESSURE: 66 MMHG | OXYGEN SATURATION: 95 %

## 2024-08-09 DIAGNOSIS — E03.9 HYPOTHYROIDISM, UNSPECIFIED TYPE: ICD-10-CM

## 2024-08-09 DIAGNOSIS — I50.32 CHRONIC DIASTOLIC (CONGESTIVE) HEART FAILURE (HCC): ICD-10-CM

## 2024-08-09 DIAGNOSIS — R26.2 AMBULATORY DYSFUNCTION: ICD-10-CM

## 2024-08-09 DIAGNOSIS — I10 ESSENTIAL HYPERTENSION: Primary | ICD-10-CM

## 2024-08-09 PROCEDURE — 99309 SBSQ NF CARE MODERATE MDM 30: CPT

## 2024-08-09 RX ORDER — GUAIFENESIN 600 MG/1
600 TABLET, EXTENDED RELEASE ORAL EVERY 12 HOURS SCHEDULED
COMMUNITY

## 2024-08-09 NOTE — ASSESSMENT & PLAN NOTE
Wt Readings from Last 3 Encounters:   04/16/24 86.6 kg (191 lb)   12/29/23 83.7 kg (184 lb 8 oz)   09/08/23 82.6 kg (182 lb)     Weights have been stable, last documented 185.5 lb  Appears euvolemic  Stable chronic b/l LE edema, stable  Continue to wear compression stockings  Continue to monitor for weight gain, SOB, increased edema  Continue metoprolol  mg daily  Continue torsemide 20 mg daily

## 2024-08-09 NOTE — PROGRESS NOTES
[facility]  [address] [address] [phone]  [facility]  32 (LTC)        NAME: Chito Brown  AGE: 89 y.o. SEX: male CODE STATUS: No CPR    DATE OF ENCOUNTER: 8/9/2024    Assessment and Plan     1. Essential hypertension  Assessment & Plan:  BP stable, 128/66  Continue torsemide 20 mg daily  Continue metoprolol  mg daily  Encourage heart healthy low sodium diet  2. Chronic diastolic (congestive) heart failure (HCC)  Assessment & Plan:  Wt Readings from Last 3 Encounters:   04/16/24 86.6 kg (191 lb)   12/29/23 83.7 kg (184 lb 8 oz)   09/08/23 82.6 kg (182 lb)     Weights have been stable, last documented 185.5 lb  Appears euvolemic  Stable chronic b/l LE edema, stable  Continue to wear compression stockings  Continue to monitor for weight gain, SOB, increased edema  Continue metoprolol  mg daily  Continue torsemide 20 mg daily    3. Hypothyroidism, unspecified type  Assessment & Plan:  TSH on 4/4/24 was 3.76  Continue levothyroxine 75 mcg daily  4. Ambulatory dysfunction  Assessment & Plan:  Multifactorial in the setting of advanced age and chronic medical conditions  Fall/safety Precautions  Ensure adequate nutrition/hydration   Continue 24/7 supportive care per LTC  Assist patient with ADL's/IADLs       All medications and routine orders were reviewed and updated as needed.    Chief Complaint     LTC follow up visit   Patient's care was coordinated with nursing facility staff. Recent vitals, labs, and updated medications were review on [facility] system in facility.  Past Medical and Surgical History      Past Medical History:   Diagnosis Date    Anxiety     Bilateral lower leg cellulitis 9/22/2021    BPH with obstruction/lower urinary tract symptoms     CAD (coronary artery disease)     Colon polyp     Glaucoma     Gross hematuria     History of thrombolytic therapy     Hypertension     Hypothyroidism     Major depressive disorder     Osteoarthritis      Paraphimosis     Peptic ulcer disease     SIRS of non-infectious origin w/o acute organ dysfunction (HCC)     Skin cancer (melanoma) (HCC)     SOB (shortness of breath)     Stroke (HCC)     Uncircumcised male      Past Surgical History:   Procedure Laterality Date    CYSTOSCOPY  05/27/2016    TRANSURETHRAL RESECTION OF PROSTATE       No Known Allergies       History of Present Illness     HPI  Chito Brown is a 89-year-old male, he is a LTC resident of [facility] since 8/31/21. Past Medical Hx including but not limited to CAD, BPH, HTN, hypothyroidism, CHF, and ambulatory dysfunction was seen in collaboration with nursing for medical management and LTC follow up.     Chito was seen and examined today. On exam he is sitting in his wc having lunch and does not appear to be in distress. He is awake and alert to self.  He denies pain, sleep disturbance, and has a good appetite.  Requires assistance with ADL's/IADL's. Chronic B/L LE edema, LSC but decreased, 95% on RA.     He denies any CP/SOB/N/V/D. Denies lightheadedness, dizziness, headaches, vision changes. Patient states he is eating well and staying hydrated. Denies any bowel or bladder issues. Per review of SNF records, Patient is eating 3 meals per day, consuming %, with supplemental nutritional shakes. Last documented BM 8/8/24. No concerns from nursing at this time.    The patient's allergies, past medical, surgical, social and family history were reviewed and unchanged.    Review of Systems     Review of Systems   Constitutional:  Positive for activity change. Negative for appetite change, chills and fever.        Uses wheelchair baseline   HENT: Negative.  Negative for congestion.    Eyes:  Positive for visual disturbance.        Glaucoma   Respiratory: Negative.  Negative for cough, shortness of breath and wheezing.    Cardiovascular:  Positive for leg swelling. Negative for chest pain and palpitations.   Gastrointestinal: Negative.   Negative for abdominal distention, abdominal pain, constipation, diarrhea, nausea and vomiting.   Endocrine: Negative.    Genitourinary:  Negative for difficulty urinating and dysuria.   Musculoskeletal:  Positive for gait problem.   Skin: Negative.    Allergic/Immunologic: Negative.    Neurological:  Positive for weakness. Negative for dizziness, light-headedness and headaches.   Hematological: Negative.    Psychiatric/Behavioral: Negative.  Negative for sleep disturbance.          Objective     Vitals:   Vitals:    08/09/24 1359   BP: 128/66   Pulse: 58   Resp: 18   Temp: 97.6 °F (36.4 °C)   SpO2: 95%               Physical Exam  Vitals and nursing note reviewed.   Constitutional:       General: He is not in acute distress.     Appearance: Normal appearance. He is not ill-appearing.   HENT:      Head: Normocephalic and atraumatic.      Nose: No congestion.   Eyes:      Conjunctiva/sclera: Conjunctivae normal.   Cardiovascular:      Rate and Rhythm: Normal rate and regular rhythm.      Pulses: Normal pulses.      Heart sounds: Normal heart sounds.   Pulmonary:      Effort: Pulmonary effort is normal.      Breath sounds: Normal breath sounds.   Abdominal:      General: Bowel sounds are normal. There is no distension.      Palpations: Abdomen is soft.      Tenderness: There is no abdominal tenderness.   Musculoskeletal:      Right lower leg: Edema present.      Left lower leg: Edema present.      Comments: Chronic bilateral lower extremity edema, wears compression stockings   Skin:     General: Skin is warm.   Neurological:      Mental Status: He is alert and oriented to person, place, and time.      Motor: Weakness present.      Gait: Gait abnormal.   Psychiatric:         Mood and Affect: Mood normal.         Behavior: Behavior normal.         Pertinent Laboratory/Diagnostic Studies:   Reviewed in facility chart-stable      Current Medications   Medications reviewed and updated see facility MAR for  "details.      Current Outpatient Medications:     atropine (ISOPTO ATROPINE) 1 % ophthalmic solution, Administer 1 drop into the left eye in the morning, Disp: , Rfl:     finasteride (PROSCAR) 5 mg tablet, TAKE 1 TABLET (5 MG TOTAL) BY MOUTH DAILY BPH, Disp: 90 tablet, Rfl: 3    guaiFENesin (Mucinex) 600 mg 12 hr tablet, Take 600 mg by mouth every 12 (twelve) hours, Disp: , Rfl:     levothyroxine 75 mcg tablet, Take 75 mcg by mouth daily, Disp: , Rfl:     Melatonin 5 MG TABS, Take 5 mg by mouth daily at bedtime, Disp: , Rfl:     metoprolol succinate (TOPROL-XL) 100 mg 24 hr tablet, Take 100 mg by mouth daily, Disp: , Rfl:     multivitamin-iron-minerals-folic acid (CENTRUM) chewable tablet, Chew 1 tablet daily, Disp: , Rfl:     prednisoLONE acetate (PRED FORTE) 1 % ophthalmic suspension, Administer 1 drop into the left eye in the morning, Disp: , Rfl:     tamsulosin (FLOMAX) 0.4 mg, TAKE 1 CAPSULE BY MOUTH EVERY DAY WITH DINNER, Disp: 90 capsule, Rfl: 3    torsemide (DEMADEX) 20 mg tablet, Take 20 mg by mouth daily, Disp: , Rfl:      Please note:  Voice-recognition software may have been used in the preparation of this document.  Occasional wrong word or \"sound-alike\" substitutions may have occurred due to the inherent limitations of voice recognition software.  Interpretation should be guided by context.         AZALEA Nava  8/9/2024  2:08 PM    "

## 2024-09-04 ENCOUNTER — NURSING HOME VISIT (OUTPATIENT)
Dept: WOUND CARE | Facility: HOSPITAL | Age: 89
End: 2024-09-04
Payer: COMMERCIAL

## 2024-09-04 DIAGNOSIS — L82.1 SEBORRHEIC KERATOSIS: Primary | ICD-10-CM

## 2024-09-04 PROCEDURE — 99304 1ST NF CARE SF/LOW MDM 25: CPT | Performed by: NURSE PRACTITIONER

## 2024-09-04 NOTE — ASSESSMENT & PLAN NOTE
Right upper back  Patient have multiple seborrheic keratoses on his back.  The open wound on the right upper back is probably from a dislodge scab seborrheic keratosis.  Wound is 100% granulation, with no obvious sign of infection.  Local wound care with calcium alginate  If wound failed to heal, I will recommend referral to dermatologist  Continue to offload  Follow-up next week

## 2024-09-04 NOTE — PROGRESS NOTES
[facility] WOUND CARE MANAGEMENT   AND HYPERBARIC MEDICINE CENTER       Patient ID: Chito Brown is a 89 y.o. male Date of Birth 12/25/1924     Location of Service: [facility]    Performed wound round with: Wound team     Chief Complaint : Right back    Wound Instructions:  Wound: Right upper back  Discontinue previous wound order  Cleanse the wound bed with NSS   Apply non-sting skin prep to periwound area  Apply calcium alginate to wound bed, then cover with bordered gauze  Frequency : daily and prn for soiling  Offload all wounds  Turn and reposition frequently  Instruct / Assist with weight shifting in wheelchair  Increase protein intake.  Monitor for any sign of infection or worsening, inform PCP or patient's primary physician in your facility.      Allergies  Patient has no known allergies.      Assessment & Plan:  1. Seborrheic keratosis  Assessment & Plan:  Right upper back  Patient have multiple seborrheic keratoses on his back.  The open wound on the right upper back is probably from a dislodge scab seborrheic keratosis.  Wound is 100% granulation, with no obvious sign of infection.  Local wound care with calcium alginate  If wound failed to heal, I will recommend referral to dermatologist  Continue to offload  Follow-up next week           Subjective:   September 4, 2024.  New consult for wound on the right upper back.  Patient was referred by Senior care team.  Patient medical history includes but not limited to hypertension and congestive heart failure.  I introduced myself to patient and patient agreed to be seen for wound consult.    Wound history: As per report, patient have a new wound on the right upper back.  No history of fall or injury on the back.  Current wound treatment is bordered gauze    Received patient in chair, seems comfortable.  Denies pain.  As per patient, he suddenly have wound on his right upper back.  No other significant issues reported related to the  wound.        Review of Systems   Constitutional: Negative.    Respiratory: Negative.     Cardiovascular: Negative.    Skin:  Positive for wound.   Psychiatric/Behavioral: Negative.         Objective:    Physical Exam  Constitutional:       Appearance: Normal appearance.   Cardiovascular:      Rate and Rhythm: Normal rate.   Pulmonary:      Effort: Pulmonary effort is normal.   Musculoskeletal:      Comments: Wheelchair-bound   Skin:     Findings: Lesion present.      Comments: Right upper back: Wound size is 2.1 x 1.8 x 0.1 cm.,  100% granulation, moderate amount of serous drainage, periwound is normal, with no obvious sign of infection    Back : Multiple seborrheic keratosis   Neurological:      Mental Status: He is alert.              Procedures           Patient's care was coordinated with nursing facility staff. Recent vitals, labs and updated medications were reviewed on EMR or chart system of facility. Past Medical, surgical, social, medication and allergy history and patient's previous records were reviewed and updated as appropriate: Most up-to date information is available in the facility EMR where the patient is currently admitted.    Patient Active Problem List   Diagnosis    Chronic cough    Coronary artery disease involving native heart without angina pectoris    Chronic diastolic (congestive) heart failure (HCC)    Essential hypertension    Late onset Alzheimer's dementia without behavioral disturbance (HCC)    BPH (benign prostatic hyperplasia)    Dermatitis associated with incontinence    Ambulatory dysfunction    Seborrheic keratosis    Hypothyroidism    Vitamin D deficiency    Hypertensive heart disease with congestive heart failure (HCC)     Past Medical History:   Diagnosis Date    Anxiety     Bilateral lower leg cellulitis 9/22/2021    BPH with obstruction/lower urinary tract symptoms     CAD (coronary artery disease)     Colon polyp     Glaucoma     Gross hematuria     History of thrombolytic  therapy     Hypertension     Hypothyroidism     Major depressive disorder     Osteoarthritis     Paraphimosis     Peptic ulcer disease     SIRS of non-infectious origin w/o acute organ dysfunction (HCC)     Skin cancer (melanoma) (HCC)     SOB (shortness of breath)     Stroke (HCC)     Uncircumcised male      Past Surgical History:   Procedure Laterality Date    CYSTOSCOPY  05/27/2016    TRANSURETHRAL RESECTION OF PROSTATE       Social History     Socioeconomic History    Marital status:      Spouse name: None    Number of children: None    Years of education: None    Highest education level: None   Occupational History    None   Tobacco Use    Smoking status: Former    Smokeless tobacco: Never   Substance and Sexual Activity    Alcohol use: No    Drug use: No    Sexual activity: None   Other Topics Concern    None   Social History Narrative    None     Social Determinants of Health     Financial Resource Strain: Not on file   Food Insecurity: Not on file   Transportation Needs: Not on file   Physical Activity: Not on file   Stress: Not on file   Social Connections: Not on file   Intimate Partner Violence: Not on file   Housing Stability: Not on file        Current Outpatient Medications:     atropine (ISOPTO ATROPINE) 1 % ophthalmic solution, Administer 1 drop into the left eye in the morning, Disp: , Rfl:     finasteride (PROSCAR) 5 mg tablet, TAKE 1 TABLET (5 MG TOTAL) BY MOUTH DAILY BPH, Disp: 90 tablet, Rfl: 3    guaiFENesin (Mucinex) 600 mg 12 hr tablet, Take 600 mg by mouth every 12 (twelve) hours, Disp: , Rfl:     levothyroxine 75 mcg tablet, Take 75 mcg by mouth daily, Disp: , Rfl:     Melatonin 5 MG TABS, Take 5 mg by mouth daily at bedtime, Disp: , Rfl:     metoprolol succinate (TOPROL-XL) 100 mg 24 hr tablet, Take 100 mg by mouth daily, Disp: , Rfl:     multivitamin-iron-minerals-folic acid (CENTRUM) chewable tablet, Chew 1 tablet daily, Disp: , Rfl:     prednisoLONE acetate (PRED FORTE) 1 %  "ophthalmic suspension, Administer 1 drop into the left eye in the morning, Disp: , Rfl:     tamsulosin (FLOMAX) 0.4 mg, TAKE 1 CAPSULE BY MOUTH EVERY DAY WITH DINNER, Disp: 90 capsule, Rfl: 3    torsemide (DEMADEX) 20 mg tablet, Take 20 mg by mouth daily, Disp: , Rfl:   History reviewed. No pertinent family history.           Coordination of Care: Wound team aware of the treatment plan. Facility nurse will provide wound treatment and monitor the wound for any changes.     Patient / Staff education : Patient / Staff was given education on sign of infection and pressure ulcer prevention. Patient/ Staff verbalized understanding     Follow up :  Next week    Voice-recognition software may have been used in the preparation of this document. Occasional wrong word or \"sound-alike\" substitutions may have occurred due to the inherent limitations of voice recognition software. Interpretation should be guided by context.      AZALEA Holley  "

## 2024-09-11 ENCOUNTER — NURSING HOME VISIT (OUTPATIENT)
Dept: WOUND CARE | Facility: HOSPITAL | Age: 89
End: 2024-09-11
Payer: COMMERCIAL

## 2024-09-11 DIAGNOSIS — L82.1 SEBORRHEIC KERATOSIS: Primary | ICD-10-CM

## 2024-09-11 PROCEDURE — 99308 SBSQ NF CARE LOW MDM 20: CPT | Performed by: NURSE PRACTITIONER

## 2024-09-11 NOTE — PROGRESS NOTES
[facility] WOUND CARE MANAGEMENT   AND HYPERBARIC MEDICINE CENTER       Patient ID: Chito Brown is a 89 y.o. male Date of Birth 12/25/1924     Location of Service: [facility]    Performed wound round with: Wound team     Chief Complaint : Right back    Wound Instructions:  Wound: Right upper back  Discontinue previous wound order  Cleanse the wound bed with NSS   Apply non-sting skin prep to periwound area  Apply triamcinolone cream to wound bed, then cover with bordered gauze  Frequency : Twice a day and prn for soiling  Offload all wounds  Turn and reposition frequently  Instruct / Assist with weight shifting in wheelchair  Increase protein intake.  Monitor for any sign of infection or worsening, inform PCP or patient's primary physician in your facility.      Allergies  Patient has no known allergies.      Assessment & Plan:  1. Seborrheic keratosis  Assessment & Plan:  Right upper back  Patient have multiple seborrheic keratoses on his back.  The open wound on the right upper back is probably from a dislodge scab seborrheic keratosis.      Assessment and plan\  Wound is almost the same as last week, full-thickness, with no obvious sign of infection  Local wound care -change to triamcinolone  If wound failed to heal, I will recommend referral to dermatologist  Continue to offload  Follow-up next week             Subjective:   September 4, 2024.  New consult for wound on the right upper back.  Patient was referred by Senior care team.  Patient medical history includes but not limited to hypertension and congestive heart failure.  I introduced myself to patient and patient agreed to be seen for wound consult.    Wound history: As per report, patient have a new wound on the right upper back.  No history of fall or injury on the back.  Current wound treatment is bordered gauze    Received patient in chair, seems comfortable.  Denies pain.  As per patient, he suddenly have wound on his right  upper back.  No other significant issues reported related to the wound.    September 11, 2024.  Follow-up for wound on the right upper back.  Received patient in bed, seems comfortable.  Denies pain.        Review of Systems   Constitutional: Negative.    Respiratory: Negative.     Cardiovascular: Negative.    Skin:  Positive for wound.   Psychiatric/Behavioral: Negative.         Objective:    Physical Exam  Constitutional:       Appearance: Normal appearance.   Cardiovascular:      Rate and Rhythm: Normal rate.   Pulmonary:      Effort: Pulmonary effort is normal.   Musculoskeletal:      Comments: Wheelchair-bound   Skin:     Findings: Lesion present.      Comments: Right upper back: Wound size is 1.7 x 1.5 x 0.1 cm, 100% granulation, moderate amount of serous sanguinous drainage, periwound is normal, with no obvious sign of infection    Back : Multiple seborrheic keratosis   Neurological:      Mental Status: He is alert.      Gait: Gait abnormal.              Procedures           Patient's care was coordinated with nursing facility staff. Recent vitals, labs and updated medications were reviewed on EMR or chart system of facility. Past Medical, surgical, social, medication and allergy history and patient's previous records were reviewed and updated as appropriate: Most up-to date information is available in the facility EMR where the patient is currently admitted.    Patient Active Problem List   Diagnosis    Chronic cough    Coronary artery disease involving native heart without angina pectoris    Chronic diastolic (congestive) heart failure (HCC)    Essential hypertension    Late onset Alzheimer's dementia without behavioral disturbance (HCC)    BPH (benign prostatic hyperplasia)    Dermatitis associated with incontinence    Ambulatory dysfunction    Seborrheic keratosis    Hypothyroidism    Vitamin D deficiency    Hypertensive heart disease with congestive heart failure (HCC)     Past Medical History:    Diagnosis Date    Anxiety     Bilateral lower leg cellulitis 9/22/2021    BPH with obstruction/lower urinary tract symptoms     CAD (coronary artery disease)     Colon polyp     Glaucoma     Gross hematuria     History of thrombolytic therapy     Hypertension     Hypothyroidism     Major depressive disorder     Osteoarthritis     Paraphimosis     Peptic ulcer disease     SIRS of non-infectious origin w/o acute organ dysfunction (HCC)     Skin cancer (melanoma) (HCC)     SOB (shortness of breath)     Stroke (HCC)     Uncircumcised male      Past Surgical History:   Procedure Laterality Date    CYSTOSCOPY  05/27/2016    TRANSURETHRAL RESECTION OF PROSTATE       Social History     Socioeconomic History    Marital status:      Spouse name: None    Number of children: None    Years of education: None    Highest education level: None   Occupational History    None   Tobacco Use    Smoking status: Former    Smokeless tobacco: Never   Substance and Sexual Activity    Alcohol use: No    Drug use: No    Sexual activity: None   Other Topics Concern    None   Social History Narrative    None     Social Determinants of Health     Financial Resource Strain: Not on file   Food Insecurity: Not on file   Transportation Needs: Not on file   Physical Activity: Not on file   Stress: Not on file   Social Connections: Not on file   Intimate Partner Violence: Not on file   Housing Stability: Not on file        Current Outpatient Medications:     atropine (ISOPTO ATROPINE) 1 % ophthalmic solution, Administer 1 drop into the left eye in the morning, Disp: , Rfl:     finasteride (PROSCAR) 5 mg tablet, TAKE 1 TABLET (5 MG TOTAL) BY MOUTH DAILY BPH, Disp: 90 tablet, Rfl: 3    guaiFENesin (Mucinex) 600 mg 12 hr tablet, Take 600 mg by mouth every 12 (twelve) hours, Disp: , Rfl:     levothyroxine 75 mcg tablet, Take 75 mcg by mouth daily, Disp: , Rfl:     Melatonin 5 MG TABS, Take 5 mg by mouth daily at bedtime, Disp: , Rfl:      "metoprolol succinate (TOPROL-XL) 100 mg 24 hr tablet, Take 100 mg by mouth daily, Disp: , Rfl:     multivitamin-iron-minerals-folic acid (CENTRUM) chewable tablet, Chew 1 tablet daily, Disp: , Rfl:     prednisoLONE acetate (PRED FORTE) 1 % ophthalmic suspension, Administer 1 drop into the left eye in the morning, Disp: , Rfl:     tamsulosin (FLOMAX) 0.4 mg, TAKE 1 CAPSULE BY MOUTH EVERY DAY WITH DINNER, Disp: 90 capsule, Rfl: 3    torsemide (DEMADEX) 20 mg tablet, Take 20 mg by mouth daily, Disp: , Rfl:   History reviewed. No pertinent family history.           Coordination of Care: Wound team aware of the treatment plan. Facility nurse will provide wound treatment and monitor the wound for any changes.     Patient / Staff education : Patient / Staff was given education on sign of infection and pressure ulcer prevention. Patient/ Staff verbalized understanding     Follow up :  Next week    Voice-recognition software may have been used in the preparation of this document. Occasional wrong word or \"sound-alike\" substitutions may have occurred due to the inherent limitations of voice recognition software. Interpretation should be guided by context.      AZALEA Holley  "

## 2024-09-11 NOTE — ASSESSMENT & PLAN NOTE
Right upper back  Patient have multiple seborrheic keratoses on his back.  The open wound on the right upper back is probably from a dislodge scab seborrheic keratosis.      Assessment and plan\  Wound is almost the same as last week, full-thickness, with no obvious sign of infection  Local wound care -change to triamcinolone  If wound failed to heal, I will recommend referral to dermatologist  Continue to offload  Follow-up next week

## 2024-09-18 ENCOUNTER — NURSING HOME VISIT (OUTPATIENT)
Dept: WOUND CARE | Facility: HOSPITAL | Age: 89
End: 2024-09-18
Payer: COMMERCIAL

## 2024-09-18 DIAGNOSIS — L82.1 SEBORRHEIC KERATOSIS: Primary | ICD-10-CM

## 2024-09-18 PROCEDURE — 99308 SBSQ NF CARE LOW MDM 20: CPT | Performed by: NURSE PRACTITIONER

## 2024-09-19 NOTE — ASSESSMENT & PLAN NOTE
Right upper back  Patient have multiple seborrheic keratoses on his back.  The open wound on the right upper back is probably from a dislodge scab seborrheic keratosis.      Assessment and plan\  Wound size is slightly increased, 2 x 2.2 cm compared to last week measurement of 1.7 x 1.5 cm, still full-thickness  Local wound care - triamcinolone -continue for 1 more week  If wound failed to heal, I will recommend referral to dermatologist  Continue to offload  Follow-up next week

## 2024-09-19 NOTE — PROGRESS NOTES
[facility] WOUND CARE MANAGEMENT   AND HYPERBARIC MEDICINE CENTER       Patient ID: Chito Brown is a 89 y.o. male Date of Birth 12/25/1924     Location of Service: [facility]    Performed wound round with: Wound team     Chief Complaint : Right back    Wound Instructions:  Wound: Right upper back  Discontinue previous wound order  Cleanse the wound bed with NSS   Apply non-sting skin prep to periwound area  Apply triamcinolone cream to wound bed, then cover with bordered gauze  Frequency : Twice a day and prn for soiling  Offload all wounds  Turn and reposition frequently  Instruct / Assist with weight shifting in wheelchair  Increase protein intake.  Monitor for any sign of infection or worsening, inform PCP or patient's primary physician in your facility.      Allergies  Patient has no known allergies.      Assessment & Plan:  1. Seborrheic keratosis  Assessment & Plan:  Right upper back  Patient have multiple seborrheic keratoses on his back.  The open wound on the right upper back is probably from a dislodge scab seborrheic keratosis.      Assessment and plan\  Wound size is slightly increased, 2 x 2.2 cm compared to last week measurement of 1.7 x 1.5 cm, still full-thickness  Local wound care - triamcinolone -continue for 1 more week  If wound failed to heal, I will recommend referral to dermatologist  Continue to offload  Follow-up next week               Subjective:   September 4, 2024.  New consult for wound on the right upper back.  Patient was referred by Senior care team.  Patient medical history includes but not limited to hypertension and congestive heart failure.  I introduced myself to patient and patient agreed to be seen for wound consult.    Wound history: As per report, patient have a new wound on the right upper back.  No history of fall or injury on the back.  Current wound treatment is bordered gauze    Received patient in chair, seems comfortable.  Denies pain.  As per  patient, he suddenly have wound on his right upper back.  No other significant issues reported related to the wound.    September 11, 2024.  Follow-up for wound on the right upper back.  Received patient in bed, seems comfortable.  Denies pain.    September 18, 2024.  Follow-up for wound on the right upper back.  No significant issues reported related to the wound.        Review of Systems   Constitutional: Negative.    Respiratory: Negative.     Cardiovascular: Negative.    Skin:  Positive for wound.   Psychiatric/Behavioral: Negative.         Objective:    Physical Exam  Constitutional:       Appearance: Normal appearance.   Cardiovascular:      Rate and Rhythm: Normal rate.   Pulmonary:      Effort: Pulmonary effort is normal.   Musculoskeletal:      Comments: Wheelchair-bound   Skin:     Findings: Lesion present.      Comments: Right upper back: Wound size is 2 x 2.2 x 0.1 cm, 100% granulation, small amount of serous  drainage, periwound is normal, with no obvious sign of infection    Back : Multiple seborrheic keratosis   Neurological:      Mental Status: He is alert.      Gait: Gait abnormal.              Procedures           Patient's care was coordinated with nursing facility staff. Recent vitals, labs and updated medications were reviewed on EMR or chart system of facility. Past Medical, surgical, social, medication and allergy history and patient's previous records were reviewed and updated as appropriate: Most up-to date information is available in the facility EMR where the patient is currently admitted.    Patient Active Problem List   Diagnosis    Chronic cough    Coronary artery disease involving native heart without angina pectoris    Chronic diastolic (congestive) heart failure (HCC)    Essential hypertension    Late onset Alzheimer's dementia without behavioral disturbance (HCC)    BPH (benign prostatic hyperplasia)    Dermatitis associated with incontinence    Ambulatory dysfunction    Seborrheic  keratosis    Hypothyroidism    Vitamin D deficiency    Hypertensive heart disease with congestive heart failure (HCC)     Past Medical History:   Diagnosis Date    Anxiety     Bilateral lower leg cellulitis 9/22/2021    BPH with obstruction/lower urinary tract symptoms     CAD (coronary artery disease)     Colon polyp     Glaucoma     Gross hematuria     History of thrombolytic therapy     Hypertension     Hypothyroidism     Major depressive disorder     Osteoarthritis     Paraphimosis     Peptic ulcer disease     SIRS of non-infectious origin w/o acute organ dysfunction (HCC)     Skin cancer (melanoma) (HCC)     SOB (shortness of breath)     Stroke (HCC)     Uncircumcised male      Past Surgical History:   Procedure Laterality Date    CYSTOSCOPY  05/27/2016    TRANSURETHRAL RESECTION OF PROSTATE       Social History     Socioeconomic History    Marital status:      Spouse name: None    Number of children: None    Years of education: None    Highest education level: None   Occupational History    None   Tobacco Use    Smoking status: Former    Smokeless tobacco: Never   Substance and Sexual Activity    Alcohol use: No    Drug use: No    Sexual activity: None   Other Topics Concern    None   Social History Narrative    None     Social Determinants of Health     Financial Resource Strain: Not on file   Food Insecurity: Not on file   Transportation Needs: Not on file   Physical Activity: Not on file   Stress: Not on file   Social Connections: Not on file   Intimate Partner Violence: Not on file   Housing Stability: Not on file        Current Outpatient Medications:     atropine (ISOPTO ATROPINE) 1 % ophthalmic solution, Administer 1 drop into the left eye in the morning, Disp: , Rfl:     finasteride (PROSCAR) 5 mg tablet, TAKE 1 TABLET (5 MG TOTAL) BY MOUTH DAILY BPH, Disp: 90 tablet, Rfl: 3    guaiFENesin (Mucinex) 600 mg 12 hr tablet, Take 600 mg by mouth every 12 (twelve) hours, Disp: , Rfl:     levothyroxine  "75 mcg tablet, Take 75 mcg by mouth daily, Disp: , Rfl:     Melatonin 5 MG TABS, Take 5 mg by mouth daily at bedtime, Disp: , Rfl:     metoprolol succinate (TOPROL-XL) 100 mg 24 hr tablet, Take 100 mg by mouth daily, Disp: , Rfl:     multivitamin-iron-minerals-folic acid (CENTRUM) chewable tablet, Chew 1 tablet daily, Disp: , Rfl:     prednisoLONE acetate (PRED FORTE) 1 % ophthalmic suspension, Administer 1 drop into the left eye in the morning, Disp: , Rfl:     tamsulosin (FLOMAX) 0.4 mg, TAKE 1 CAPSULE BY MOUTH EVERY DAY WITH DINNER, Disp: 90 capsule, Rfl: 3    torsemide (DEMADEX) 20 mg tablet, Take 20 mg by mouth daily, Disp: , Rfl:   History reviewed. No pertinent family history.           Coordination of Care: Wound team aware of the treatment plan. Facility nurse will provide wound treatment and monitor the wound for any changes.     Patient / Staff education : Patient / Staff was given education on sign of infection and pressure ulcer prevention. Patient/ Staff verbalized understanding     Follow up :  Next week    Voice-recognition software may have been used in the preparation of this document. Occasional wrong word or \"sound-alike\" substitutions may have occurred due to the inherent limitations of voice recognition software. Interpretation should be guided by context.      AZALEA Holley  "

## 2024-09-25 ENCOUNTER — NURSING HOME VISIT (OUTPATIENT)
Dept: WOUND CARE | Facility: HOSPITAL | Age: 89
End: 2024-09-25
Payer: COMMERCIAL

## 2024-09-25 DIAGNOSIS — L82.1 SEBORRHEIC KERATOSIS: Primary | ICD-10-CM

## 2024-09-25 PROCEDURE — 99308 SBSQ NF CARE LOW MDM 20: CPT | Performed by: NURSE PRACTITIONER

## 2024-09-25 NOTE — PROGRESS NOTES
[facility] WOUND CARE MANAGEMENT   AND HYPERBARIC MEDICINE CENTER       Patient ID: Chito Brown is a 89 y.o. male Date of Birth 12/25/1924     Location of Service: [facility]    Performed wound round with: Wound team     Chief Complaint : Right back    Wound Instructions:  Wound: Right upper back  Discontinue previous wound order  Cleanse the wound bed with NSS   Apply non-sting skin prep to periwound area  Apply triamcinolone cream to wound bed, then cover with bordered gauze  Frequency : Twice a day and prn for soiling  Offload all wounds  Turn and reposition frequently  Instruct / Assist with weight shifting in wheelchair  Increase protein intake.  Monitor for any sign of infection or worsening, inform PCP or patient's primary physician in your facility.      Allergies  Patient has no known allergies.      Assessment & Plan:  1. Seborrheic keratosis  Assessment & Plan:  Right upper back  Patient have multiple seborrheic keratoses on his back.  The open wound on the right upper back is probably from a dislodge scab seborrheic keratosis.      Assessment and plan\  Wound bed improved, increased epithelial  Local wound care - triamcinolone -continue for 1 more week  If wound failed to heal, I will recommend referral to dermatologist  Continue to offload  Follow-up next week                 Subjective:   September 4, 2024.  New consult for wound on the right upper back.  Patient was referred by Senior care team.  Patient medical history includes but not limited to hypertension and congestive heart failure.  I introduced myself to patient and patient agreed to be seen for wound consult.    Wound history: As per report, patient have a new wound on the right upper back.  No history of fall or injury on the back.  Current wound treatment is bordered gauze    Received patient in chair, seems comfortable.  Denies pain.  As per patient, he suddenly have wound on his right upper back.  No other  significant issues reported related to the wound.    September 11, 2024.  Follow-up for wound on the right upper back.  Received patient in bed, seems comfortable.  Denies pain.    September 18, 2024.  Follow-up for wound on the right upper back.  No significant issues reported related to the wound.    9/25/2024 Follow up for wound on the right upper back. Received patient, not in distress. Facility staff did not report any significant issues related to the wound.           Review of Systems   Constitutional: Negative.    Respiratory: Negative.     Cardiovascular: Negative.    Skin:  Positive for wound.   Psychiatric/Behavioral: Negative.         Objective:    Physical Exam  Constitutional:       Appearance: Normal appearance.   Cardiovascular:      Rate and Rhythm: Normal rate.   Pulmonary:      Effort: Pulmonary effort is normal.   Musculoskeletal:      Comments: Wheelchair-bound   Skin:     Findings: Lesion present.      Comments: Right upper back: Wound size is 2.2 x 2.3 x 0 cm.,  50% epithelial, 50% granulation, small amount of serous drainage, periwound normal, with no obvious sign of infection   Neurological:      Mental Status: He is alert.      Gait: Gait abnormal.              Procedures           Patient's care was coordinated with nursing facility staff. Recent vitals, labs and updated medications were reviewed on EMR or chart system of facility. Past Medical, surgical, social, medication and allergy history and patient's previous records were reviewed and updated as appropriate: Most up-to date information is available in the facility EMR where the patient is currently admitted.    Patient Active Problem List   Diagnosis    Chronic cough    Coronary artery disease involving native heart without angina pectoris    Chronic diastolic (congestive) heart failure (HCC)    Essential hypertension    Late onset Alzheimer's dementia without behavioral disturbance (HCC)    BPH (benign prostatic hyperplasia)     Dermatitis associated with incontinence    Ambulatory dysfunction    Seborrheic keratosis    Hypothyroidism    Vitamin D deficiency    Hypertensive heart disease with congestive heart failure (HCC)     Past Medical History:   Diagnosis Date    Anxiety     Bilateral lower leg cellulitis 9/22/2021    BPH with obstruction/lower urinary tract symptoms     CAD (coronary artery disease)     Colon polyp     Glaucoma     Gross hematuria     History of thrombolytic therapy     Hypertension     Hypothyroidism     Major depressive disorder     Osteoarthritis     Paraphimosis     Peptic ulcer disease     SIRS of non-infectious origin w/o acute organ dysfunction (HCC)     Skin cancer (melanoma) (HCC)     SOB (shortness of breath)     Stroke (HCC)     Uncircumcised male      Past Surgical History:   Procedure Laterality Date    CYSTOSCOPY  05/27/2016    TRANSURETHRAL RESECTION OF PROSTATE       Social History     Socioeconomic History    Marital status:      Spouse name: None    Number of children: None    Years of education: None    Highest education level: None   Occupational History    None   Tobacco Use    Smoking status: Former    Smokeless tobacco: Never   Substance and Sexual Activity    Alcohol use: No    Drug use: No    Sexual activity: None   Other Topics Concern    None   Social History Narrative    None     Social Determinants of Health     Financial Resource Strain: Not on file   Food Insecurity: Not on file   Transportation Needs: Not on file   Physical Activity: Not on file   Stress: Not on file   Social Connections: Not on file   Intimate Partner Violence: Not on file   Housing Stability: Not on file        Current Outpatient Medications:     atropine (ISOPTO ATROPINE) 1 % ophthalmic solution, Administer 1 drop into the left eye in the morning, Disp: , Rfl:     finasteride (PROSCAR) 5 mg tablet, TAKE 1 TABLET (5 MG TOTAL) BY MOUTH DAILY BPH, Disp: 90 tablet, Rfl: 3    guaiFENesin (Mucinex) 600 mg 12 hr  "tablet, Take 600 mg by mouth every 12 (twelve) hours, Disp: , Rfl:     levothyroxine 75 mcg tablet, Take 75 mcg by mouth daily, Disp: , Rfl:     Melatonin 5 MG TABS, Take 5 mg by mouth daily at bedtime, Disp: , Rfl:     metoprolol succinate (TOPROL-XL) 100 mg 24 hr tablet, Take 100 mg by mouth daily, Disp: , Rfl:     multivitamin-iron-minerals-folic acid (CENTRUM) chewable tablet, Chew 1 tablet daily, Disp: , Rfl:     prednisoLONE acetate (PRED FORTE) 1 % ophthalmic suspension, Administer 1 drop into the left eye in the morning, Disp: , Rfl:     tamsulosin (FLOMAX) 0.4 mg, TAKE 1 CAPSULE BY MOUTH EVERY DAY WITH DINNER, Disp: 90 capsule, Rfl: 3    torsemide (DEMADEX) 20 mg tablet, Take 20 mg by mouth daily, Disp: , Rfl:   History reviewed. No pertinent family history.           Coordination of Care: Wound team aware of the treatment plan. Facility nurse will provide wound treatment and monitor the wound for any changes.     Patient / Staff education : Patient / Staff was given education on sign of infection and pressure ulcer prevention. Patient/ Staff verbalized understanding     Follow up :  Next week    Voice-recognition software may have been used in the preparation of this document. Occasional wrong word or \"sound-alike\" substitutions may have occurred due to the inherent limitations of voice recognition software. Interpretation should be guided by context.      AZALEA Holley  "

## 2024-09-25 NOTE — ASSESSMENT & PLAN NOTE
Right upper back  Patient have multiple seborrheic keratoses on his back.  The open wound on the right upper back is probably from a dislodge scab seborrheic keratosis.      Assessment and plan\  Wound bed improved, increased epithelial  Local wound care - triamcinolone -continue for 1 more week  If wound failed to heal, I will recommend referral to dermatologist  Continue to offload  Follow-up next week

## 2024-10-02 ENCOUNTER — NURSING HOME VISIT (OUTPATIENT)
Dept: WOUND CARE | Facility: HOSPITAL | Age: 89
End: 2024-10-02
Payer: COMMERCIAL

## 2024-10-02 DIAGNOSIS — L82.1 SEBORRHEIC KERATOSIS: Primary | ICD-10-CM

## 2024-10-02 PROCEDURE — 99308 SBSQ NF CARE LOW MDM 20: CPT | Performed by: NURSE PRACTITIONER

## 2024-10-02 NOTE — ASSESSMENT & PLAN NOTE
Right upper back  Patient have multiple seborrheic keratoses on his back.  The open wound on the right upper back is probably from a dislodge scab seborrheic keratosis.      Assessment and plan\  Wound had not been showing any improvement, no significant change from last week  Change local wound care to gentamicin 1% topical ointment or cream  If wound does not show any improvement, we will need to refer patient for biopsy.    Continue to offload  Follow-up next week

## 2024-10-02 NOTE — PROGRESS NOTES
[facility] WOUND CARE MANAGEMENT   AND HYPERBARIC MEDICINE CENTER       Patient ID: Chito Brown is a 89 y.o. male Date of Birth 12/25/1924     Location of Service: [facility]    Performed wound round with: Wound team     Chief Complaint : Right back    Wound Instructions:  Wound: Right upper back  Discontinue previous wound order  Cleanse the wound bed with NSS   Apply non-sting skin prep to periwound area  Apply gentamicin 0.1% topical cream or ointment to wound bed, then cover with bordered gauze  Frequency : Daily and prn for soiling  Offload all wounds  Turn and reposition frequently  Instruct / Assist with weight shifting in wheelchair  Increase protein intake.  Monitor for any sign of infection or worsening, inform PCP or patient's primary physician in your facility.      Allergies  Patient has no known allergies.      Assessment & Plan:  1. Seborrheic keratosis  Assessment & Plan:  Right upper back  Patient have multiple seborrheic keratoses on his back.  The open wound on the right upper back is probably from a dislodge scab seborrheic keratosis.      Assessment and plan\  Wound had not been showing any improvement, no significant change from last week  Change local wound care to gentamicin 1% topical ointment or cream  If wound does not show any improvement, we will need to refer patient for biopsy.    Continue to offload  Follow-up next week                   Subjective:   September 4, 2024.  New consult for wound on the right upper back.  Patient was referred by Senior care team.  Patient medical history includes but not limited to hypertension and congestive heart failure.  I introduced myself to patient and patient agreed to be seen for wound consult.    Wound history: As per report, patient have a new wound on the right upper back.  No history of fall or injury on the back.  Current wound treatment is bordered gauze    Received patient in chair, seems comfortable.  Denies pain.   As per patient, he suddenly have wound on his right upper back.  No other significant issues reported related to the wound.    September 11, 2024.  Follow-up for wound on the right upper back.  Received patient in bed, seems comfortable.  Denies pain.    September 18, 2024.  Follow-up for wound on the right upper back.  No significant issues reported related to the wound.    9/25/2024 Follow up for wound on the right upper back. Received patient, not in distress. Facility staff did not report any significant issues related to the wound.     October 2, 2024.  Follow-up for wound on the right upper back.  Received patient in bed, seems comfortable.  Denies pain.        Review of Systems   Constitutional: Negative.    Respiratory: Negative.     Cardiovascular: Negative.    Skin:  Positive for wound.   Psychiatric/Behavioral: Negative.         Objective:    Physical Exam  Constitutional:       Appearance: Normal appearance.   Cardiovascular:      Rate and Rhythm: Normal rate.   Pulmonary:      Effort: Pulmonary effort is normal.   Musculoskeletal:      Comments: Wheelchair-bound   Skin:     Findings: Lesion present.      Comments: Right upper back: Wound size is 2.3 x 2.2 x 0.1 cm.,  100% granulation, no drainage, periwound is normal, with no obvious sign of infection   Neurological:      Mental Status: He is alert.      Gait: Gait abnormal.              Procedures           Patient's care was coordinated with nursing facility staff. Recent vitals, labs and updated medications were reviewed on EMR or chart system of facility. Past Medical, surgical, social, medication and allergy history and patient's previous records were reviewed and updated as appropriate: Most up-to date information is available in the facility EMR where the patient is currently admitted.    Patient Active Problem List   Diagnosis    Chronic cough    Coronary artery disease involving native heart without angina pectoris    Chronic diastolic  (congestive) heart failure (HCC)    Essential hypertension    Late onset Alzheimer's dementia without behavioral disturbance (HCC)    BPH (benign prostatic hyperplasia)    Dermatitis associated with incontinence    Ambulatory dysfunction    Seborrheic keratosis    Hypothyroidism    Vitamin D deficiency    Hypertensive heart disease with congestive heart failure (HCC)     Past Medical History:   Diagnosis Date    Anxiety     Bilateral lower leg cellulitis 9/22/2021    BPH with obstruction/lower urinary tract symptoms     CAD (coronary artery disease)     Colon polyp     Glaucoma     Gross hematuria     History of thrombolytic therapy     Hypertension     Hypothyroidism     Major depressive disorder     Osteoarthritis     Paraphimosis     Peptic ulcer disease     SIRS of non-infectious origin w/o acute organ dysfunction (HCC)     Skin cancer (melanoma) (HCC)     SOB (shortness of breath)     Stroke (HCC)     Uncircumcised male      Past Surgical History:   Procedure Laterality Date    CYSTOSCOPY  05/27/2016    TRANSURETHRAL RESECTION OF PROSTATE       Social History     Socioeconomic History    Marital status:      Spouse name: None    Number of children: None    Years of education: None    Highest education level: None   Occupational History    None   Tobacco Use    Smoking status: Former    Smokeless tobacco: Never   Substance and Sexual Activity    Alcohol use: No    Drug use: No    Sexual activity: None   Other Topics Concern    None   Social History Narrative    None     Social Determinants of Health     Financial Resource Strain: Not on file   Food Insecurity: Not on file   Transportation Needs: Not on file   Physical Activity: Not on file   Stress: Not on file   Social Connections: Not on file   Intimate Partner Violence: Not on file   Housing Stability: Not on file        Current Outpatient Medications:     atropine (ISOPTO ATROPINE) 1 % ophthalmic solution, Administer 1 drop into the left eye in the  "morning, Disp: , Rfl:     finasteride (PROSCAR) 5 mg tablet, TAKE 1 TABLET (5 MG TOTAL) BY MOUTH DAILY BPH, Disp: 90 tablet, Rfl: 3    guaiFENesin (Mucinex) 600 mg 12 hr tablet, Take 600 mg by mouth every 12 (twelve) hours, Disp: , Rfl:     levothyroxine 75 mcg tablet, Take 75 mcg by mouth daily, Disp: , Rfl:     Melatonin 5 MG TABS, Take 5 mg by mouth daily at bedtime, Disp: , Rfl:     metoprolol succinate (TOPROL-XL) 100 mg 24 hr tablet, Take 100 mg by mouth daily, Disp: , Rfl:     multivitamin-iron-minerals-folic acid (CENTRUM) chewable tablet, Chew 1 tablet daily, Disp: , Rfl:     prednisoLONE acetate (PRED FORTE) 1 % ophthalmic suspension, Administer 1 drop into the left eye in the morning, Disp: , Rfl:     tamsulosin (FLOMAX) 0.4 mg, TAKE 1 CAPSULE BY MOUTH EVERY DAY WITH DINNER, Disp: 90 capsule, Rfl: 3    torsemide (DEMADEX) 20 mg tablet, Take 20 mg by mouth daily, Disp: , Rfl:   History reviewed. No pertinent family history.           Coordination of Care: Wound team aware of the treatment plan. Facility nurse will provide wound treatment and monitor the wound for any changes.     Patient / Staff education : Patient / Staff was given education on sign of infection and pressure ulcer prevention. Patient/ Staff verbalized understanding     Follow up :  Next week    Voice-recognition software may have been used in the preparation of this document. Occasional wrong word or \"sound-alike\" substitutions may have occurred due to the inherent limitations of voice recognition software. Interpretation should be guided by context.      AZALEA Holley  "

## 2024-10-03 ENCOUNTER — NURSING HOME VISIT (OUTPATIENT)
Dept: GERIATRICS | Facility: OTHER | Age: 89
End: 2024-10-03
Payer: COMMERCIAL

## 2024-10-03 DIAGNOSIS — I50.32 CHRONIC DIASTOLIC (CONGESTIVE) HEART FAILURE (HCC): Primary | ICD-10-CM

## 2024-10-03 DIAGNOSIS — R26.2 AMBULATORY DYSFUNCTION: ICD-10-CM

## 2024-10-03 DIAGNOSIS — I10 ESSENTIAL HYPERTENSION: ICD-10-CM

## 2024-10-03 DIAGNOSIS — E03.9 HYPOTHYROIDISM, UNSPECIFIED TYPE: ICD-10-CM

## 2024-10-03 PROCEDURE — 99309 SBSQ NF CARE MODERATE MDM 30: CPT | Performed by: FAMILY MEDICINE

## 2024-10-03 RX ORDER — FLUTICASONE PROPIONATE 50 MCG
2 SPRAY, SUSPENSION (ML) NASAL 2 TIMES DAILY
COMMUNITY

## 2024-10-03 NOTE — PROGRESS NOTES
[facility]  [address] [address] [phone]  [facility]  [place] [address]      NAME: Chito Brown  AGE: 89 y.o. SEX: male [phone]    DATE OF ENCOUNTER: 10/3/2024    Assessment and Plan     1. Chronic diastolic (congestive) heart failure (HCC)  Assessment & Plan:  Weights have been stable, last documented 184 lb  Appears euvolemic  Stable chronic b/l LE edema, stable  Continue to wear compression stockings  Continue to monitor for weight gain, SOB, increased edema  Continue metoprolol  mg daily  Continue torsemide 20 mg daily  2. Essential hypertension  Assessment & Plan:  BP stable, 122/68  Continue torsemide 20 mg daily  Continue metoprolol  mg daily  Encourage heart healthy low sodium diet  3. Hypothyroidism, unspecified type  Assessment & Plan:  TSH on 4/4/24 was 3.76  Continue levothyroxine 75 mcg daily  4. Ambulatory dysfunction  Assessment & Plan:  Multifactorial in the setting of advanced age and chronic medical conditions  Fall/safety Precautions  Ensure adequate nutrition/hydration   Continue 24/7 supportive care per LTC  Assist patient with ADL's/IADLs    CMP, CBC, and TSH ordered.    - Counseling Documentation: patient was counseled regarding: risk factor reductions    Chief Complaint     Routine Long term follow-up visit.    History of Present Illness     Chito Brown is a 89-year-old male, he is a LTC resident of [facility] since 8/31/21. Past Medical Hx including but not limited to CAD, BPH, HTN, hypothyroidism, CHF, and ambulatory dysfunction was seen in collaboration with nursing for medical management and LTC follow up.      Chito was seen and examined today. On exam he does not appear to be in distress. He is awake and alert to self.  He denies pain, sleep disturbance, and has a good appetite, eating well and staying hydrated.  Requires assistance with ADL's/IADL's. Chronic B/L LE edema.    Last documented BM 10/2/24. No concerns from nursing  at this time.        The following portions of the patient's history were reviewed and updated as appropriate: allergies, current medications, past family history, past medical history, past social history, past surgical history and problem list.    Review of Systems     Review of Systems   Constitutional:  Negative for appetite change, chills and fatigue.        Wheelchair at baseline   HENT:  Negative for congestion and trouble swallowing.    Eyes:  Positive for visual disturbance (glaucoma).   Respiratory:  Negative for cough, choking, chest tightness and shortness of breath.    Cardiovascular:  Positive for leg swelling. Negative for chest pain and palpitations.   Gastrointestinal:  Negative for abdominal pain, constipation, diarrhea, nausea and vomiting.   Neurological:  Positive for weakness. Negative for dizziness and headaches.   Psychiatric/Behavioral:  Negative for agitation.        Active Problem List     Patient Active Problem List   Diagnosis    Chronic cough    Coronary artery disease involving native heart without angina pectoris    Chronic diastolic (congestive) heart failure (HCC)    Essential hypertension    Late onset Alzheimer's dementia without behavioral disturbance (HCC)    BPH (benign prostatic hyperplasia)    Dermatitis associated with incontinence    Ambulatory dysfunction    Seborrheic keratosis    Hypothyroidism    Vitamin D deficiency    Hypertensive heart disease with congestive heart failure (HCC)       Objective     Vitals: Temp 97.9, HR 72, RR 18, /68, SpO2 96% on room air. Weight 184 lbs.    Physical Exam  Vitals reviewed.   Constitutional:       General: He is not in acute distress.     Appearance: He is not ill-appearing.      Comments: Sitting comfortably in wheelchair   HENT:      Nose: No congestion or rhinorrhea.   Eyes:      Conjunctiva/sclera: Conjunctivae normal.   Cardiovascular:      Rate and Rhythm: Normal rate and regular rhythm.      Pulses: Normal pulses.       Heart sounds: Normal heart sounds.   Pulmonary:      Effort: Pulmonary effort is normal. No respiratory distress.      Breath sounds: No stridor. No wheezing, rhonchi or rales.      Comments: Diminished lung sounds in bilateral apex.  Chest:      Chest wall: No tenderness.   Abdominal:      General: Bowel sounds are normal.      Palpations: Abdomen is soft.      Tenderness: There is no abdominal tenderness.   Musculoskeletal:      Right lower leg: Edema present.      Left lower leg: Edema present.      Right foot: Deformity present.      Left foot: Deformity present.      Comments: Chronic B/L LE edema with use of compression stockings   Feet:      Right foot:      Skin integrity: Dry skin present.      Toenail Condition: Right toenails are abnormally thick and long.      Left foot:      Skin integrity: Dry skin present.      Toenail Condition: Left toenails are abnormally thick and long.      Comments: Diminished dorsalis pedis and posterior tibialis; capillary refill > 3 seconds. Sensation and proprioception intact.  Skin:     General: Skin is warm.   Neurological:      Mental Status: He is alert.      Motor: No weakness.      Gait: Abnormal gait: able to move himself with lower extremities in walker.   Psychiatric:         Mood and Affect: Mood normal.         Behavior: Behavior normal.         Pertinent Laboratory/Diagnostic Studies:  Refer to facility chart.        Current Medications   Medications reviewed and updated in facility chart.

## 2024-10-03 NOTE — ASSESSMENT & PLAN NOTE
Weights have been stable, last documented 184 lb  Appears euvolemic  Stable chronic b/l LE edema, stable  Continue to wear compression stockings  Continue to monitor for weight gain, SOB, increased edema  Continue metoprolol  mg daily  Continue torsemide 20 mg daily

## 2024-10-03 NOTE — ASSESSMENT & PLAN NOTE
BP stable, 122/68  Continue torsemide 20 mg daily  Continue metoprolol  mg daily  Encourage heart healthy low sodium diet

## 2024-10-09 ENCOUNTER — NURSING HOME VISIT (OUTPATIENT)
Dept: WOUND CARE | Facility: HOSPITAL | Age: 89
End: 2024-10-09
Payer: COMMERCIAL

## 2024-10-09 DIAGNOSIS — L82.1 SEBORRHEIC KERATOSIS: Primary | ICD-10-CM

## 2024-10-09 PROCEDURE — 99308 SBSQ NF CARE LOW MDM 20: CPT | Performed by: NURSE PRACTITIONER

## 2024-10-09 NOTE — ASSESSMENT & PLAN NOTE
Right upper back  Patient have multiple seborrheic keratoses on his back.  The open wound on the right upper back is probably from a dislodge scab seborrheic keratosis.      Assessment and plan\  Wound improved, increased epithelial, wound size is 2.3 x 2.1 x 0.1 cm  Continue local wound care with gentamicin 0.1%  If wound does not show any improvement, we will need to refer patient for biopsy.    Continue to offload  Follow-up next week

## 2024-10-09 NOTE — PROGRESS NOTES
[facility] WOUND CARE MANAGEMENT   AND HYPERBARIC MEDICINE CENTER       Patient ID: Chito Brown is a 89 y.o. male Date of Birth 12/25/1924     Location of Service: [facility]    Performed wound round with: Wound team     Chief Complaint : Right back    Wound Instructions:  Wound: Right upper back  Discontinue previous wound order  Cleanse the wound bed with NSS   Apply non-sting skin prep to periwound area  Apply gentamicin 0.1% topical cream or ointment to wound bed, then cover with bordered gauze  Frequency : Daily and prn for soiling  Offload all wounds  Turn and reposition frequently  Instruct / Assist with weight shifting in wheelchair  Increase protein intake.  Monitor for any sign of infection or worsening, inform PCP or patient's primary physician in your facility.      Allergies  Patient has no known allergies.      Assessment & Plan:  1. Seborrheic keratosis  Assessment & Plan:  Right upper back  Patient have multiple seborrheic keratoses on his back.  The open wound on the right upper back is probably from a dislodge scab seborrheic keratosis.      Assessment and plan\  Wound improved, increased epithelial, wound size is 2.3 x 2.1 x 0.1 cm  Continue local wound care with gentamicin 0.1%  If wound does not show any improvement, we will need to refer patient for biopsy.    Continue to offload  Follow-up next week                     Subjective:   September 4, 2024.  New consult for wound on the right upper back.  Patient was referred by Senior care team.  Patient medical history includes but not limited to hypertension and congestive heart failure.  I introduced myself to patient and patient agreed to be seen for wound consult.    Wound history: As per report, patient have a new wound on the right upper back.  No history of fall or injury on the back.  Current wound treatment is bordered gauze    Received patient in chair, seems comfortable.  Denies pain.  As per patient, he suddenly  have wound on his right upper back.  No other significant issues reported related to the wound.    September 11, 2024.  Follow-up for wound on the right upper back.  Received patient in bed, seems comfortable.  Denies pain.    September 18, 2024.  Follow-up for wound on the right upper back.  No significant issues reported related to the wound.    9/25/2024 Follow up for wound on the right upper back. Received patient, not in distress. Facility staff did not report any significant issues related to the wound.     October 2, 2024.  Follow-up for wound on the right upper back.  Received patient in bed, seems comfortable.  Denies pain.    10/9/2024 Follow up for wound on the right upper back. Received patient, not in distress. Facility staff did not report any significant issues related to the wound.           Review of Systems   Constitutional: Negative.    Respiratory: Negative.     Cardiovascular: Negative.    Skin:  Positive for wound.   Psychiatric/Behavioral: Negative.         Objective:    Physical Exam  Constitutional:       Appearance: Normal appearance.   Cardiovascular:      Rate and Rhythm: Normal rate.   Pulmonary:      Effort: Pulmonary effort is normal.   Musculoskeletal:      Comments: Wheelchair-bound   Skin:     Findings: Lesion present.      Comments: Right upper back: Wound size is 2.3 x 2.1 x 0.1 cm.,  50% granulation, 50% epithelial, small amount of serous drainage,periwound is normal, with no obvious sign of infection   Neurological:      Mental Status: He is alert.      Gait: Gait abnormal.              Procedures           Patient's care was coordinated with nursing facility staff. Recent vitals, labs and updated medications were reviewed on EMR or chart system of facility. Past Medical, surgical, social, medication and allergy history and patient's previous records were reviewed and updated as appropriate: Most up-to date information is available in the facility EMR where the patient is  currently admitted.    Patient Active Problem List   Diagnosis    Chronic cough    Coronary artery disease involving native heart without angina pectoris    Chronic diastolic (congestive) heart failure (HCC)    Essential hypertension    Late onset Alzheimer's dementia without behavioral disturbance (HCC)    BPH (benign prostatic hyperplasia)    Dermatitis associated with incontinence    Ambulatory dysfunction    Seborrheic keratosis    Hypothyroidism    Vitamin D deficiency    Hypertensive heart disease with congestive heart failure (HCC)     Past Medical History:   Diagnosis Date    Anxiety     Bilateral lower leg cellulitis 9/22/2021    BPH with obstruction/lower urinary tract symptoms     CAD (coronary artery disease)     Colon polyp     Glaucoma     Gross hematuria     History of thrombolytic therapy     Hypertension     Hypothyroidism     Major depressive disorder     Osteoarthritis     Paraphimosis     Peptic ulcer disease     SIRS of non-infectious origin w/o acute organ dysfunction (HCC)     Skin cancer (melanoma) (HCC)     SOB (shortness of breath)     Stroke (HCC)     Uncircumcised male      Past Surgical History:   Procedure Laterality Date    CYSTOSCOPY  05/27/2016    TRANSURETHRAL RESECTION OF PROSTATE       Social History     Socioeconomic History    Marital status:      Spouse name: None    Number of children: None    Years of education: None    Highest education level: None   Occupational History    None   Tobacco Use    Smoking status: Former    Smokeless tobacco: Never   Substance and Sexual Activity    Alcohol use: No    Drug use: No    Sexual activity: None   Other Topics Concern    None   Social History Narrative    None     Social Determinants of Health     Financial Resource Strain: Not on file   Food Insecurity: Not on file   Transportation Needs: Not on file   Physical Activity: Not on file   Stress: Not on file   Social Connections: Not on file   Intimate Partner Violence: Not on  "file   Housing Stability: Not on file        Current Outpatient Medications:     atropine (ISOPTO ATROPINE) 1 % ophthalmic solution, Administer 1 drop into the left eye in the morning, Disp: , Rfl:     finasteride (PROSCAR) 5 mg tablet, TAKE 1 TABLET (5 MG TOTAL) BY MOUTH DAILY BPH, Disp: 90 tablet, Rfl: 3    fluticasone (FLONASE) 50 mcg/act nasal spray, 2 sprays into each nostril 2 (two) times a day, Disp: , Rfl:     guaiFENesin (Mucinex) 600 mg 12 hr tablet, Take 600 mg by mouth every 12 (twelve) hours, Disp: , Rfl:     levothyroxine 75 mcg tablet, Take 75 mcg by mouth daily, Disp: , Rfl:     Melatonin 5 MG TABS, Take 5 mg by mouth daily at bedtime, Disp: , Rfl:     metoprolol succinate (TOPROL-XL) 100 mg 24 hr tablet, Take 100 mg by mouth daily, Disp: , Rfl:     multivitamin-iron-minerals-folic acid (CENTRUM) chewable tablet, Chew 1 tablet daily, Disp: , Rfl:     prednisoLONE acetate (PRED FORTE) 1 % ophthalmic suspension, Administer 1 drop into the left eye in the morning, Disp: , Rfl:     tamsulosin (FLOMAX) 0.4 mg, TAKE 1 CAPSULE BY MOUTH EVERY DAY WITH DINNER, Disp: 90 capsule, Rfl: 3    torsemide (DEMADEX) 20 mg tablet, Take 20 mg by mouth daily, Disp: , Rfl:   History reviewed. No pertinent family history.           Coordination of Care: Wound team aware of the treatment plan. Facility nurse will provide wound treatment and monitor the wound for any changes.     Patient / Staff education : Patient / Staff was given education on sign of infection and pressure ulcer prevention. Patient/ Staff verbalized understanding     Follow up :  Next week    Voice-recognition software may have been used in the preparation of this document. Occasional wrong word or \"sound-alike\" substitutions may have occurred due to the inherent limitations of voice recognition software. Interpretation should be guided by context.      AZALEA Holley  "

## 2024-10-16 ENCOUNTER — NURSING HOME VISIT (OUTPATIENT)
Dept: WOUND CARE | Facility: HOSPITAL | Age: 89
End: 2024-10-16
Payer: COMMERCIAL

## 2024-10-16 DIAGNOSIS — L82.1 SEBORRHEIC KERATOSIS: Primary | ICD-10-CM

## 2024-10-16 PROCEDURE — 99308 SBSQ NF CARE LOW MDM 20: CPT | Performed by: NURSE PRACTITIONER

## 2024-10-17 NOTE — PROGRESS NOTES
[facility] WOUND CARE MANAGEMENT   AND HYPERBARIC MEDICINE CENTER       Patient ID: Chito Brown is a 89 y.o. male Date of Birth 12/25/1924     Location of Service: [facility]    Performed wound round with: Wound team     Chief Complaint : Right back    Wound Instructions:  Wound: Right upper back  Discontinue previous wound order  Cleanse the wound bed with NSS   Apply non-sting skin prep to periwound area  Apply gentamicin 0.1% topical cream or ointment to wound bed, then cover with bordered gauze  Frequency : Daily and prn for soiling  Offload all wounds  Turn and reposition frequently  Instruct / Assist with weight shifting in wheelchair  Increase protein intake.  Monitor for any sign of infection or worsening, inform PCP or patient's primary physician in your facility.      Allergies  Patient has no known allergies.      Assessment & Plan:  1. Seborrheic keratosis  Assessment & Plan:  Right upper back  Patient have multiple seborrheic keratoses on his back.  The open wound on the right upper back is probably from a dislodge scab seborrheic keratosis.      Assessment and plan\  Wound significantly improved, decreasing wound size, increased epithelial  Continue local wound care with gentamicin 0.1%  If wound does not show any improvement, we will need to refer patient for biopsy.    Continue to offload  Follow-up in 2 weeks                       Subjective:   September 4, 2024.  New consult for wound on the right upper back.  Patient was referred by Senior care team.  Patient medical history includes but not limited to hypertension and congestive heart failure.  I introduced myself to patient and patient agreed to be seen for wound consult.    Wound history: As per report, patient have a new wound on the right upper back.  No history of fall or injury on the back.  Current wound treatment is bordered gauze    Received patient in chair, seems comfortable.  Denies pain.  As per patient, he  suddenly have wound on his right upper back.  No other significant issues reported related to the wound.    September 11, 2024.  Follow-up for wound on the right upper back.  Received patient in bed, seems comfortable.  Denies pain.    September 18, 2024.  Follow-up for wound on the right upper back.  No significant issues reported related to the wound.    9/25/2024 Follow up for wound on the right upper back. Received patient, not in distress. Facility staff did not report any significant issues related to the wound.     October 2, 2024.  Follow-up for wound on the right upper back.  Received patient in bed, seems comfortable.  Denies pain.    10/9/2024 Follow up for wound on the right upper back. Received patient, not in distress. Facility staff did not report any significant issues related to the wound.     October 16, 2024.  Follow-up for wound on the right upper back.  Received patient in chair, seems comfortable.  Denies pain.        Review of Systems   Constitutional: Negative.    Respiratory: Negative.     Cardiovascular: Negative.    Skin:  Positive for wound.   Psychiatric/Behavioral: Negative.         Objective:    Physical Exam  Constitutional:       Appearance: Normal appearance.   Cardiovascular:      Rate and Rhythm: Normal rate.   Pulmonary:      Effort: Pulmonary effort is normal.   Musculoskeletal:      Comments: Wheelchair-bound   Skin:     Findings: Lesion present.      Comments: Right upper back: Wound size is 1.2 x 1.1 cm.,  100% epithelial, no drainage, periwound normal, with no obvious sign of infection   Neurological:      Mental Status: He is alert.      Gait: Gait abnormal.              Procedures           Patient's care was coordinated with nursing facility staff. Recent vitals, labs and updated medications were reviewed on EMR or chart system of facility. Past Medical, surgical, social, medication and allergy history and patient's previous records were reviewed and updated as  appropriate: Most up-to date information is available in the facility EMR where the patient is currently admitted.    Patient Active Problem List   Diagnosis    Chronic cough    Coronary artery disease involving native heart without angina pectoris    Chronic diastolic (congestive) heart failure (HCC)    Essential hypertension    Late onset Alzheimer's dementia without behavioral disturbance (HCC)    BPH (benign prostatic hyperplasia)    Dermatitis associated with incontinence    Ambulatory dysfunction    Seborrheic keratosis    Hypothyroidism    Vitamin D deficiency    Hypertensive heart disease with congestive heart failure (HCC)     Past Medical History:   Diagnosis Date    Anxiety     Bilateral lower leg cellulitis 9/22/2021    BPH with obstruction/lower urinary tract symptoms     CAD (coronary artery disease)     Colon polyp     Glaucoma     Gross hematuria     History of thrombolytic therapy     Hypertension     Hypothyroidism     Major depressive disorder     Osteoarthritis     Paraphimosis     Peptic ulcer disease     SIRS of non-infectious origin w/o acute organ dysfunction (HCC)     Skin cancer (melanoma) (HCC)     SOB (shortness of breath)     Stroke (HCC)     Uncircumcised male      Past Surgical History:   Procedure Laterality Date    CYSTOSCOPY  05/27/2016    TRANSURETHRAL RESECTION OF PROSTATE       Social History     Socioeconomic History    Marital status:      Spouse name: None    Number of children: None    Years of education: None    Highest education level: None   Occupational History    None   Tobacco Use    Smoking status: Former    Smokeless tobacco: Never   Substance and Sexual Activity    Alcohol use: No    Drug use: No    Sexual activity: None   Other Topics Concern    None   Social History Narrative    None     Social Determinants of Health     Financial Resource Strain: Not on file   Food Insecurity: Not on file   Transportation Needs: Not on file   Physical Activity: Not on file  "  Stress: Not on file   Social Connections: Not on file   Intimate Partner Violence: Not on file   Housing Stability: Not on file        Current Outpatient Medications:     atropine (ISOPTO ATROPINE) 1 % ophthalmic solution, Administer 1 drop into the left eye in the morning, Disp: , Rfl:     finasteride (PROSCAR) 5 mg tablet, TAKE 1 TABLET (5 MG TOTAL) BY MOUTH DAILY BPH, Disp: 90 tablet, Rfl: 3    fluticasone (FLONASE) 50 mcg/act nasal spray, 2 sprays into each nostril 2 (two) times a day, Disp: , Rfl:     guaiFENesin (Mucinex) 600 mg 12 hr tablet, Take 600 mg by mouth every 12 (twelve) hours, Disp: , Rfl:     levothyroxine 75 mcg tablet, Take 75 mcg by mouth daily, Disp: , Rfl:     Melatonin 5 MG TABS, Take 5 mg by mouth daily at bedtime, Disp: , Rfl:     metoprolol succinate (TOPROL-XL) 100 mg 24 hr tablet, Take 100 mg by mouth daily, Disp: , Rfl:     multivitamin-iron-minerals-folic acid (CENTRUM) chewable tablet, Chew 1 tablet daily, Disp: , Rfl:     prednisoLONE acetate (PRED FORTE) 1 % ophthalmic suspension, Administer 1 drop into the left eye in the morning, Disp: , Rfl:     tamsulosin (FLOMAX) 0.4 mg, TAKE 1 CAPSULE BY MOUTH EVERY DAY WITH DINNER, Disp: 90 capsule, Rfl: 3    torsemide (DEMADEX) 20 mg tablet, Take 20 mg by mouth daily, Disp: , Rfl:   History reviewed. No pertinent family history.           Coordination of Care: Wound team aware of the treatment plan. Facility nurse will provide wound treatment and monitor the wound for any changes.     Patient / Staff education : Patient / Staff was given education on sign of infection and pressure ulcer prevention. Patient/ Staff verbalized understanding     Follow up :  2 weeks    Voice-recognition software may have been used in the preparation of this document. Occasional wrong word or \"sound-alike\" substitutions may have occurred due to the inherent limitations of voice recognition software. Interpretation should be guided by context.      Selvin" AZALEA Fortune

## 2024-10-17 NOTE — ASSESSMENT & PLAN NOTE
Right upper back  Patient have multiple seborrheic keratoses on his back.  The open wound on the right upper back is probably from a dislodge scab seborrheic keratosis.      Assessment and plan\  Wound significantly improved, decreasing wound size, increased epithelial  Continue local wound care with gentamicin 0.1%  If wound does not show any improvement, we will need to refer patient for biopsy.    Continue to offload  Follow-up in 2 weeks

## 2024-11-22 NOTE — ASSESSMENT & PLAN NOTE
BP stable, 128/66  Continue torsemide 20 mg daily  Continue metoprolol  mg daily  Encourage heart healthy low sodium diet   Refill request pending

## 2024-12-11 ENCOUNTER — NURSING HOME VISIT (OUTPATIENT)
Dept: GERIATRICS | Facility: OTHER | Age: 89
End: 2024-12-11
Payer: COMMERCIAL

## 2024-12-11 VITALS
DIASTOLIC BLOOD PRESSURE: 68 MMHG | SYSTOLIC BLOOD PRESSURE: 126 MMHG | BODY MASS INDEX: 31.45 KG/M2 | OXYGEN SATURATION: 97 % | WEIGHT: 189 LBS | TEMPERATURE: 97.7 F | RESPIRATION RATE: 18 BRPM | HEART RATE: 62 BPM

## 2024-12-11 DIAGNOSIS — I10 ESSENTIAL HYPERTENSION: Primary | ICD-10-CM

## 2024-12-11 DIAGNOSIS — E03.9 HYPOTHYROIDISM, UNSPECIFIED TYPE: ICD-10-CM

## 2024-12-11 DIAGNOSIS — R26.2 AMBULATORY DYSFUNCTION: ICD-10-CM

## 2024-12-11 DIAGNOSIS — F02.80 LATE ONSET ALZHEIMER'S DEMENTIA WITHOUT BEHAVIORAL DISTURBANCE (HCC): ICD-10-CM

## 2024-12-11 DIAGNOSIS — I50.32 CHRONIC DIASTOLIC (CONGESTIVE) HEART FAILURE (HCC): ICD-10-CM

## 2024-12-11 DIAGNOSIS — G30.1 LATE ONSET ALZHEIMER'S DEMENTIA WITHOUT BEHAVIORAL DISTURBANCE (HCC): ICD-10-CM

## 2024-12-11 PROCEDURE — 99309 SBSQ NF CARE MODERATE MDM 30: CPT

## 2024-12-11 NOTE — ASSESSMENT & PLAN NOTE
Wt Readings from Last 3 Encounters:   12/11/24 85.7 kg (189 lb)   04/16/24 86.6 kg (191 lb)   12/29/23 83.7 kg (184 lb 8 oz)   Weights have been stable  Appears euvolemic  Stable chronic b/l LE edema  Continue to wear compression stockings  Continue to monitor for weight gain, SOB, increased edema  Continue metoprolol  mg daily  Continue torsemide 20 mg daily

## 2024-12-11 NOTE — ASSESSMENT & PLAN NOTE
BP stable, 126/68  Continue torsemide 20 mg daily  Continue metoprolol  mg daily  Encourage heart healthy low sodium diet

## 2024-12-11 NOTE — PROGRESS NOTES
[facility]  [address] [address] [phone]  [facility]  32 (LTC)        NAME: Chito Brown  AGE: 89 y.o. SEX: male CODE STATUS: No CPR    DATE OF ENCOUNTER: 12/11/2024    Assessment and Plan     1. Essential hypertension  Assessment & Plan:  BP stable, 126/68  Continue torsemide 20 mg daily  Continue metoprolol  mg daily  Encourage heart healthy low sodium diet  2. Chronic diastolic (congestive) heart failure (HCC)  Assessment & Plan:  Wt Readings from Last 3 Encounters:   12/11/24 85.7 kg (189 lb)   04/16/24 86.6 kg (191 lb)   12/29/23 83.7 kg (184 lb 8 oz)   Weights have been stable  Appears euvolemic  Stable chronic b/l LE edema  Continue to wear compression stockings  Continue to monitor for weight gain, SOB, increased edema  Continue metoprolol  mg daily  Continue torsemide 20 mg daily          3. Hypothyroidism, unspecified type  Assessment & Plan:  10/4/24 TSH 2.65  Continue levothyroxine 75 mcg daily  4. Late onset Alzheimer's dementia without behavioral disturbance (HCC)  Assessment & Plan:  Mood stable  Patient is AAOx2  Provide redirection, reorientation, distraction techniques  Fall Precautions  Assist with ADLs/IADLs  Avoid deliriogenic medications such as tramadol, benzodiazepines, anticholinergics, benadryl  Encourage Hydration/ Nutrition  Implement sleep hygiene, limit night time interuptions   Encourage participation in group activities when appropriate     5. Ambulatory dysfunction  Assessment & Plan:  Multifactorial in the setting of advanced age and chronic medical conditions  Fall/safety Precautions  Ensure adequate nutrition/hydration   Continue 24/7 supportive care per LTC  Assist patient with ADL's/IADLs       All medications and routine orders were reviewed and updated as needed.    Chief Complaint     LTC follow up visit   Patient's care was coordinated with nursing facility staff. Recent vitals, labs, and updated medications were  review on [facility] system in facility.  Past Medical and Surgical History      Past Medical History:   Diagnosis Date    Anxiety     Bilateral lower leg cellulitis 9/22/2021    BPH with obstruction/lower urinary tract symptoms     CAD (coronary artery disease)     Colon polyp     Glaucoma     Gross hematuria     History of thrombolytic therapy     Hypertension     Hypothyroidism     Major depressive disorder     Osteoarthritis     Paraphimosis     Peptic ulcer disease     SIRS of non-infectious origin w/o acute organ dysfunction (HCC)     Skin cancer (melanoma) (HCC)     SOB (shortness of breath)     Stroke (HCC)     Uncircumcised male      Past Surgical History:   Procedure Laterality Date    CYSTOSCOPY  05/27/2016    TRANSURETHRAL RESECTION OF PROSTATE       No Known Allergies       History of Present Illness     HPI  Chito Brown is a 89-year-old male, he is a LTC resident of [facility] since 8/31/21. Past Medical Hx including but not limited to CAD, BPH, HTN, hypothyroidism, CHF, and ambulatory dysfunction was seen in collaboration with nursing for medical management and LTC follow up.     Chito was seen and examined today. On exam he is sitting in his wc watching tv and does not appear to be in distress. He is awake and alert to self.  He denies pain, sleep disturbance, and has a good appetite.  Requires assistance with ADL's/IADL's. Chronic B/L LE edema, continue elevation, compression.  He denies any CP/SOB/N/V/D. Denies lightheadedness, dizziness, headaches, vision changes. Patient states he is eating well and staying hydrated. Denies any bowel or bladder issues. Per review of SNF records, Patient is eating 3 meals per day, consuming %, with supplemental nutritional shakes. Last documented BM 12/8/24. No concerns from nursing at this time.    The patient's allergies, past medical, surgical, social and family history were reviewed and unchanged.    Review of Systems     Review of  Systems   Constitutional:  Positive for activity change. Negative for appetite change, chills and fever.        Uses wheelchair baseline   HENT: Negative.  Negative for congestion.    Eyes:  Positive for visual disturbance.        Glaucoma   Respiratory: Negative.  Negative for cough, shortness of breath and wheezing.    Cardiovascular:  Positive for leg swelling. Negative for chest pain and palpitations.   Gastrointestinal: Negative.  Negative for abdominal distention, abdominal pain, constipation, diarrhea, nausea and vomiting.   Endocrine: Negative.    Genitourinary:  Negative for difficulty urinating and dysuria.   Musculoskeletal:  Positive for gait problem.   Skin: Negative.    Allergic/Immunologic: Negative.    Neurological:  Positive for weakness. Negative for dizziness, light-headedness and headaches.   Hematological: Negative.    Psychiatric/Behavioral: Negative.  Negative for sleep disturbance.          Objective     Vitals:   Vitals:    12/11/24 1341   BP: 126/68   Pulse: 62   Resp: 18   Temp: 97.7 °F (36.5 °C)   SpO2: 97%               Physical Exam  Vitals and nursing note reviewed.   Constitutional:       General: He is not in acute distress.     Appearance: Normal appearance. He is not ill-appearing.   HENT:      Head: Normocephalic and atraumatic.      Nose: No congestion.   Eyes:      Conjunctiva/sclera: Conjunctivae normal.   Cardiovascular:      Rate and Rhythm: Normal rate and regular rhythm.      Pulses: Normal pulses.      Heart sounds: Normal heart sounds.   Pulmonary:      Effort: Pulmonary effort is normal.      Breath sounds: Normal breath sounds.   Abdominal:      General: Bowel sounds are normal. There is no distension.      Palpations: Abdomen is soft.      Tenderness: There is no abdominal tenderness.   Musculoskeletal:      Right lower leg: Edema present.      Left lower leg: Edema present.      Comments: Chronic bilateral lower extremity edema   Skin:     General: Skin is warm.  "  Neurological:      Mental Status: He is alert and oriented to person, place, and time.      Motor: Weakness present.      Gait: Gait abnormal.   Psychiatric:         Mood and Affect: Mood normal.         Behavior: Behavior normal.         Pertinent Laboratory/Diagnostic Studies:   Reviewed in facility chart-stable      Current Medications   Medications reviewed and updated see facility MAR for details.      Current Outpatient Medications:     atropine (ISOPTO ATROPINE) 1 % ophthalmic solution, Administer 1 drop into the left eye in the morning, Disp: , Rfl:     finasteride (PROSCAR) 5 mg tablet, TAKE 1 TABLET (5 MG TOTAL) BY MOUTH DAILY BPH, Disp: 90 tablet, Rfl: 3    fluticasone (FLONASE) 50 mcg/act nasal spray, 2 sprays into each nostril 2 (two) times a day, Disp: , Rfl:     guaiFENesin (Mucinex) 600 mg 12 hr tablet, Take 600 mg by mouth every 12 (twelve) hours, Disp: , Rfl:     levothyroxine 75 mcg tablet, Take 75 mcg by mouth daily, Disp: , Rfl:     Melatonin 5 MG TABS, Take 5 mg by mouth daily at bedtime, Disp: , Rfl:     metoprolol succinate (TOPROL-XL) 100 mg 24 hr tablet, Take 100 mg by mouth daily, Disp: , Rfl:     multivitamin-iron-minerals-folic acid (CENTRUM) chewable tablet, Chew 1 tablet daily, Disp: , Rfl:     prednisoLONE acetate (PRED FORTE) 1 % ophthalmic suspension, Administer 1 drop into the left eye in the morning, Disp: , Rfl:     tamsulosin (FLOMAX) 0.4 mg, TAKE 1 CAPSULE BY MOUTH EVERY DAY WITH DINNER, Disp: 90 capsule, Rfl: 3    torsemide (DEMADEX) 20 mg tablet, Take 20 mg by mouth daily, Disp: , Rfl:      Please note:  Voice-recognition software may have been used in the preparation of this document.  Occasional wrong word or \"sound-alike\" substitutions may have occurred due to the inherent limitations of voice recognition software.  Interpretation should be guided by context.         AZALEA Nava  12/11/2024  1:47 PM    "

## 2025-02-10 ENCOUNTER — NURSING HOME VISIT (OUTPATIENT)
Dept: GERIATRICS | Facility: OTHER | Age: OVER 89
End: 2025-02-10
Payer: COMMERCIAL

## 2025-02-10 DIAGNOSIS — I10 ESSENTIAL HYPERTENSION: Primary | ICD-10-CM

## 2025-02-10 DIAGNOSIS — E03.9 HYPOTHYROIDISM, UNSPECIFIED TYPE: ICD-10-CM

## 2025-02-10 DIAGNOSIS — F02.80 LATE ONSET ALZHEIMER'S DEMENTIA WITHOUT BEHAVIORAL DISTURBANCE (HCC): ICD-10-CM

## 2025-02-10 DIAGNOSIS — G30.1 LATE ONSET ALZHEIMER'S DEMENTIA WITHOUT BEHAVIORAL DISTURBANCE (HCC): ICD-10-CM

## 2025-02-10 PROCEDURE — 99309 SBSQ NF CARE MODERATE MDM 30: CPT | Performed by: INTERNAL MEDICINE

## 2025-02-11 NOTE — PROGRESS NOTES
[facility]  [facility]  [address]   [address]  [place], [place], [id number] [phone]    Progress Note  Code SNF31    Patient Location     [facility]    Reason for visit     LTC follow up visit     Patient’s care was coordinated with nursing facility staff. Recent vitals, labs and updated medications were reviewed on [facility] system of facility.     Problem List Items Addressed This Visit          Cardiovascular and Mediastinum    Essential hypertension - Primary    BP stable, 126/68  Continue torsemide 20 mg daily  Continue metoprolol  mg daily  Encourage heart healthy low sodium diet            Endocrine    Hypothyroidism    10/4/24 TSH 2.65  Continue levothyroxine 75 mcg daily            Nervous and Auditory    Late onset Alzheimer's dementia without behavioral disturbance (HCC)    Mood stable  Patient is AAOx2  Provide redirection, reorientation, distraction techniques  Fall Precautions  Assist with ADLs/IADLs  Avoid deliriogenic medications such as tramadol, benzodiazepines, anticholinergics, benadryl  Encourage Hydration/ Nutrition  Implement sleep hygiene, limit night time interuptions   Encourage participation in group activities when appropriate                   Chito Brown is a 89-year-old male, he is a LTC resident of [facility] since 8/31/21. Past Medical Hx including but not limited to CAD, BPH, HTN, hypothyroidism, CHF, and ambulatory dysfunction was seen in collaboration with nursing for medical management and LTC follow up.      Chito was seen and examined today. On exam he is sitting in his wc watching tv. He appears well and denies any current new symptoms or issues including SOB, dizziness, or pain.                         Review of Systems   Constitutional:  Positive for activity change. Negative for appetite change, chills and fever.        Uses wheelchair baseline   HENT: Negative.  Negative for congestion.    Eyes:  Positive  for visual disturbance.        Glaucoma   Respiratory: Negative.  Negative for cough, shortness of breath and wheezing.    Cardiovascular:  Positive for leg swelling. Negative for chest pain and palpitations.   Gastrointestinal: Negative.  Negative for abdominal distention, abdominal pain, constipation, diarrhea, nausea and vomiting.   Endocrine: Negative.    Genitourinary:  Negative for difficulty urinating and dysuria.   Musculoskeletal:  Positive for gait problem.   Skin: Negative.    Allergic/Immunologic: Negative.    Neurological:  Positive for weakness. Negative for dizziness, light-headedness and headaches.   Hematological: Negative.    Psychiatric/Behavioral: Negative.  Negative for sleep disturbance.        Past Medical History:   Diagnosis Date    Anxiety     Bilateral lower leg cellulitis 9/22/2021    BPH with obstruction/lower urinary tract symptoms     CAD (coronary artery disease)     Colon polyp     Glaucoma     Gross hematuria     History of thrombolytic therapy     Hypertension     Hypothyroidism     Major depressive disorder     Osteoarthritis     Paraphimosis     Peptic ulcer disease     SIRS of non-infectious origin w/o acute organ dysfunction (HCC)     Skin cancer (melanoma) (HCC)     SOB (shortness of breath)     Stroke (HCC)     Uncircumcised male        Past Surgical History:   Procedure Laterality Date    CYSTOSCOPY  05/27/2016    TRANSURETHRAL RESECTION OF PROSTATE         Social History     Tobacco Use   Smoking Status Former   Smokeless Tobacco Never       No family history on file.     No Known Allergies    Updated list was reviewed in [facility] system of facility.     There were no vitals filed for this visit.    Physical Exam  HENT:      Head: Normocephalic and atraumatic.   Eyes:      Comments: Opacity left eye   Cardiovascular:      Rate and Rhythm: Normal rate and regular rhythm.      Heart sounds: Murmur heard.   Pulmonary:      Breath sounds: Rales (involving b/l lower  third) present. No wheezing or rhonchi.   Abdominal:      General: There is no distension.      Palpations: Abdomen is soft.      Tenderness: There is no abdominal tenderness. There is no guarding.   Musculoskeletal:      Cervical back: Neck supple.      Right lower leg: Edema present.      Left lower leg: Edema present.   Skin:     Coloration: Skin is not jaundiced.   Neurological:      General: No focal deficit present.      Cranial Nerves: No cranial nerve deficit.      Comments: Oriented in month and year. Does not remember the president   Psychiatric:         Mood and Affect: Mood normal.         Behavior: Behavior normal.         Diagnostic Data:    Recent labs were reviewed.    Additional Notes:     This note was electronically signed by Dr. Huseyin Morgan F[facility] PGY-2

## 2025-02-19 ENCOUNTER — TELEPHONE (OUTPATIENT)
Dept: OTHER | Facility: OTHER | Age: OVER 89
End: 2025-02-19

## 2025-02-19 NOTE — TELEPHONE ENCOUNTER
Chu with [facility] called to inform the on call provider that the Patient fell.    Contacted the on call provider via secure chat.

## 2025-04-02 NOTE — PROGRESS NOTES
[facility]  [address] [address]  [phone]  FACILITY: [facility]  Code 32 (LTC)        NAME: Chito Brown  AGE: 89 y.o. SEX: male CODE STATUS: No CPR    DATE OF ENCOUNTER: 4/3/2025    Assessment and Plan     1. Ambulatory dysfunction  Assessment & Plan:  Multifactorial.   Recent fall on 4/1, was found to have slipped out of his wheelchair.   No head strike, denies any pain or discomfort.   No injuries noted.   Continue supportive care, educated on fall precautions.   2. Late onset Alzheimer's dementia without behavioral disturbance (HCC)  Assessment & Plan:  Patient is pleasantly confused during assessment, alert and oriented x2 during assessment.   Resides at LTC for assistance with ADLs/IADLs.   Continue frequent reorientation, fall precautions, and adequate hydration/nutrition.   3. Essential hypertension  Assessment & Plan:  Stable.   Continues on Torsemide 20 mg daily, and Metoprolol 100 mg daily for BP management.   Plan to monitor BP/HR at LT, can modify if needed.   4. Chronic diastolic (congestive) heart failure (HCC)  Assessment & Plan:  Euvolemic on assessment, slight trace edema in BL lower extremities - does have chronic lower extremity edema.    Weights have remained stable, continues with monthly weights at LT.   Continues on Torsemide 20 mg daily.   Encouraged leg elevation at rest, and compression stockings during the daytime.   Educated on a low salt diet.   Plan to continue to monitor for increased signs of SOB, weight gain, or edema.   Will order CBC and BMP for the AM for routine monitoring.   5. Hypothyroidism, unspecified type  Assessment & Plan:  Has remained stable.   Continues on Levothyroxine 75 mcg daily.   Plan to order TSH for the AM.        All medications and routine orders were reviewed and updated as needed.    Chief Complaint     LTC follow up visit    Past Medical and Surgical History      Past Medical History:   Diagnosis Date     Anxiety     Bilateral lower leg cellulitis 9/22/2021    BPH with obstruction/lower urinary tract symptoms     CAD (coronary artery disease)     Colon polyp     Glaucoma     Gross hematuria     History of thrombolytic therapy     Hypertension     Hypothyroidism     Major depressive disorder     Osteoarthritis     Paraphimosis     Peptic ulcer disease     SIRS of non-infectious origin w/o acute organ dysfunction (HCC)     Skin cancer (melanoma) (HCC)     SOB (shortness of breath)     Stroke (HCC)     Uncircumcised male      Past Surgical History:   Procedure Laterality Date    CYSTOSCOPY  05/27/2016    TRANSURETHRAL RESECTION OF PROSTATE       No Known Allergies       History of Present Illness     Chito Brown is a 89 y.o. male LTC patient at [facility]. Patient has a past medical Hx including but not limited to CHF, HTN, Hypothyroidism, Alzheimer's Dementia, and BPH. Patient is medically managed by [facility], and NP/MD at [facility].     Seen and examined at bedside today. Patient is a poor historian due to Alzheimer's Dementia. He was seen sitting in his wheelchair watching TV in his room. He offers no issues or complaints. Patient recently had a fall on 4/1 without any injuries noted. Denies CP/SOB/N/V/D. Denies lightheadedness, dizziness, headaches, vision changes. Patient states they are eating well and staying hydrated. Denies any bowel or bladder issues. Per review of SNF records, patient is eating 3 meals per day, consuming 100 %. Last documented BM was 4/2. No concerns from nursing at this time.    The patient's allergies, past medical, surgical, social and family history were reviewed and unchanged.    Review of Systems     Review of Systems   HENT:  Positive for dental problem.    Eyes:  Positive for visual disturbance (left eye blind).   Musculoskeletal:  Positive for gait problem.   All other systems reviewed and are negative.        Objective     Vitals:   Vitals:    04/03/25 1039   BP: 148/58    Pulse: 70   Resp: 18   SpO2: 96%         Physical Exam  Constitutional:       General: He is not in acute distress.     Appearance: Normal appearance. He is normal weight. He is not ill-appearing.   HENT:      Head: Normocephalic and atraumatic.   Cardiovascular:      Rate and Rhythm: Normal rate and regular rhythm.      Pulses: Normal pulses.      Heart sounds: Normal heart sounds. No murmur heard.  Pulmonary:      Effort: Pulmonary effort is normal. No respiratory distress.      Breath sounds: Normal breath sounds. No wheezing.   Abdominal:      General: Bowel sounds are normal. There is no distension.      Palpations: Abdomen is soft.      Tenderness: There is no abdominal tenderness.   Musculoskeletal:         General: Normal range of motion.      Cervical back: Normal range of motion and neck supple.      Right lower leg: Edema (trace) present.      Left lower leg: Edema (trace) present.   Skin:     General: Skin is warm and dry.      Capillary Refill: Capillary refill takes less than 2 seconds.   Neurological:      General: No focal deficit present.      Mental Status: He is alert.      Motor: Weakness present.      Gait: Gait abnormal.      Comments: Alert and oriented to person and place. Disoriented to situation and time.    Psychiatric:         Mood and Affect: Mood normal.         Behavior: Behavior normal.         Pertinent Laboratory/Diagnostic Studies:     Reviewed in facility chart      Current Medications   Medications reviewed and updated see facility MAR for details.      Current Outpatient Medications:     finasteride (PROSCAR) 5 mg tablet, TAKE 1 TABLET (5 MG TOTAL) BY MOUTH DAILY BPH, Disp: 90 tablet, Rfl: 3    fluticasone (FLONASE) 50 mcg/act nasal spray, 2 sprays into each nostril 2 (two) times a day, Disp: , Rfl:     levothyroxine 75 mcg tablet, Take 75 mcg by mouth daily, Disp: , Rfl:     Melatonin 5 MG TABS, Take 5 mg by mouth daily at bedtime, Disp: , Rfl:     metoprolol succinate  "(TOPROL-XL) 100 mg 24 hr tablet, Take 100 mg by mouth daily, Disp: , Rfl:     multivitamin-iron-minerals-folic acid (CENTRUM) chewable tablet, Chew 1 tablet daily, Disp: , Rfl:     prednisoLONE acetate (PRED FORTE) 1 % ophthalmic suspension, Administer 1 drop into the left eye in the morning, Disp: , Rfl:     tamsulosin (FLOMAX) 0.4 mg, TAKE 1 CAPSULE BY MOUTH EVERY DAY WITH DINNER, Disp: 90 capsule, Rfl: 3    torsemide (DEMADEX) 20 mg tablet, Take 20 mg by mouth daily, Disp: , Rfl:     atropine (ISOPTO ATROPINE) 1 % ophthalmic solution, Administer 1 drop into the left eye in the morning, Disp: , Rfl:      Please note:  Voice-recognition software may have been used in the preparation of this document.  Occasional wrong word or \"sound-alike\" substitutions may have occurred due to the inherent limitations of voice recognition software.  Interpretation should be guided by context.         AZALEA Adam  4/3/2025      "

## 2025-04-03 ENCOUNTER — NURSING HOME VISIT (OUTPATIENT)
Dept: GERIATRICS | Facility: OTHER | Age: OVER 89
End: 2025-04-03
Payer: COMMERCIAL

## 2025-04-03 VITALS
DIASTOLIC BLOOD PRESSURE: 58 MMHG | RESPIRATION RATE: 18 BRPM | SYSTOLIC BLOOD PRESSURE: 148 MMHG | HEART RATE: 70 BPM | OXYGEN SATURATION: 96 %

## 2025-04-03 DIAGNOSIS — F02.80 LATE ONSET ALZHEIMER'S DEMENTIA WITHOUT BEHAVIORAL DISTURBANCE (HCC): ICD-10-CM

## 2025-04-03 DIAGNOSIS — I10 ESSENTIAL HYPERTENSION: ICD-10-CM

## 2025-04-03 DIAGNOSIS — G30.1 LATE ONSET ALZHEIMER'S DEMENTIA WITHOUT BEHAVIORAL DISTURBANCE (HCC): ICD-10-CM

## 2025-04-03 DIAGNOSIS — I50.32 CHRONIC DIASTOLIC (CONGESTIVE) HEART FAILURE (HCC): ICD-10-CM

## 2025-04-03 DIAGNOSIS — E03.9 HYPOTHYROIDISM, UNSPECIFIED TYPE: ICD-10-CM

## 2025-04-03 DIAGNOSIS — R26.2 AMBULATORY DYSFUNCTION: Primary | ICD-10-CM

## 2025-04-03 PROCEDURE — 99309 SBSQ NF CARE MODERATE MDM 30: CPT

## 2025-04-03 NOTE — ASSESSMENT & PLAN NOTE
Euvolemic on assessment, slight trace edema in BL lower extremities - does have chronic lower extremity edema.    Weights have remained stable, continues with monthly weights at [facility].   Continues on Torsemide 20 mg daily.   Encouraged leg elevation at rest, and compression stockings during the daytime.   Educated on a low salt diet.   Plan to continue to monitor for increased signs of SOB, weight gain, or edema.   Will order CBC and BMP for the AM for routine monitoring.

## 2025-04-03 NOTE — ASSESSMENT & PLAN NOTE
Stable.   Continues on Torsemide 20 mg daily, and Metoprolol 100 mg daily for BP management.   Plan to monitor BP/HR at LTC, can modify if needed.

## 2025-04-03 NOTE — ASSESSMENT & PLAN NOTE
Patient is pleasantly confused during assessment, alert and oriented x2 during assessment.   Resides at LTC for assistance with ADLs/IADLs.   Continue frequent reorientation, fall precautions, and adequate hydration/nutrition.

## 2025-04-03 NOTE — ASSESSMENT & PLAN NOTE
Multifactorial.   Recent fall on 4/1, was found to have slipped out of his wheelchair.   No head strike, denies any pain or discomfort.   No injuries noted.   Continue supportive care, educated on fall precautions.

## 2025-05-30 ENCOUNTER — NURSING HOME VISIT (OUTPATIENT)
Age: OVER 89
End: 2025-05-30
Payer: COMMERCIAL

## 2025-05-30 DIAGNOSIS — I10 ESSENTIAL HYPERTENSION: ICD-10-CM

## 2025-05-30 DIAGNOSIS — G30.1 LATE ONSET ALZHEIMER'S DEMENTIA WITHOUT BEHAVIORAL DISTURBANCE (HCC): ICD-10-CM

## 2025-05-30 DIAGNOSIS — R26.2 AMBULATORY DYSFUNCTION: ICD-10-CM

## 2025-05-30 DIAGNOSIS — F02.80 LATE ONSET ALZHEIMER'S DEMENTIA WITHOUT BEHAVIORAL DISTURBANCE (HCC): ICD-10-CM

## 2025-05-30 DIAGNOSIS — I50.32 CHRONIC DIASTOLIC (CONGESTIVE) HEART FAILURE (HCC): Primary | ICD-10-CM

## 2025-05-30 DIAGNOSIS — E03.9 HYPOTHYROIDISM, UNSPECIFIED TYPE: ICD-10-CM

## 2025-05-30 PROCEDURE — 99309 SBSQ NF CARE MODERATE MDM 30: CPT | Performed by: FAMILY MEDICINE

## 2025-05-30 NOTE — ASSESSMENT & PLAN NOTE
Per chart review TSH done on 04/2024 showed TSH of 3.76  Stable, on levothyroxine 725 mcg daily    Plan  Continue levothyroxine 75 mcg daily  TSH ordered at last visit

## 2025-05-30 NOTE — ASSESSMENT & PLAN NOTE
Patient with dementia   Per patient needs assistance for ADLs/IADLs for which he is at LTC  Recommends dementia precaution  C/w assistance where needed

## 2025-05-30 NOTE — ASSESSMENT & PLAN NOTE
Wt Readings from Last 3 Encounters:   12/11/24 85.7 kg (189 lb)   04/16/24 86.6 kg (191 lb)   12/29/23 83.7 kg (184 lb 8 oz)       Stable on torsemide 20 daily.   On exam today patient noted to be euvolemic with lung CTAB. Pt noted to have LE edema bilaterally, per pt and chart review it appears to be baseline   Patient with monthly weights which appear to be at baseline    Plan  -Continue low-sodium diet  -Monitor for any signs of exacerbation (shortness of breath, worsening leg swelling,and  unable to lay flat)  -Continue with routine electrolyte monitoring.    - Continue with compression and leg elevation for trace bilateral swelling

## 2025-05-30 NOTE — ASSESSMENT & PLAN NOTE
Blood pressure today **  Per chart review well-controlled on current regimen of torsemide 20 mg daily and metoprolol 100 mg daily plan    Plan  Continue current regimen  Continue to monitor blood pressure

## 2025-05-30 NOTE — PROGRESS NOTES
[facility]  [address] [address] [phone]  [facility]  POS 32      NAME: Chito Brown  AGE: 89 y.o. SEX: male [phone]    DATE OF ENCOUNTER: 5/30/2025    Assessment and Plan     1. Chronic diastolic (congestive) heart failure (HCC)  Assessment & Plan:  Wt Readings from Last 3 Encounters:   12/11/24 85.7 kg (189 lb)   04/16/24 86.6 kg (191 lb)   12/29/23 83.7 kg (184 lb 8 oz)       Stable on torsemide 20 daily.   On exam today patient noted to be euvolemic with lung CTAB. Pt noted to have LE edema bilaterally, per pt and chart review it appears to be baseline   Patient with monthly weights which appear to be at baseline    Plan  -Continue low-sodium diet  -Monitor for any signs of exacerbation (shortness of breath, worsening leg swelling,and  unable to lay flat)  -Continue with routine electrolyte monitoring.    - Continue with compression and leg elevation for trace bilateral swelling      2. Essential hypertension  Assessment & Plan:  Blood pressure today **  Per chart review well-controlled on current regimen of torsemide 20 mg daily and metoprolol 100 mg daily plan    Plan  Continue current regimen  Continue to monitor blood pressure   3. Hypothyroidism, unspecified type  Assessment & Plan:  Per chart review TSH done on 04/2024 showed TSH of 3.76  Stable, on levothyroxine 725 mcg daily    Plan  Continue levothyroxine 75 mcg daily  TSH ordered at last visit  4. Late onset Alzheimer's dementia without behavioral disturbance (HCC)  Assessment & Plan:  Patient with dementia   Per patient needs assistance for ADLs/IADLs for which he is at LTC  Recommends dementia precaution  C/w assistance where needed       5. Ambulatory dysfunction  Assessment & Plan:  Patient with history of ambulatory dysfunction thought to be multifactorial  Currently wheel chair bound  Reports no recent falls since last seen   Recommend fall precautions        Chief Complaint     Routine Long term follow-up  visit.    History of Present Illness     HPI    Chito Brown is 89-year-old with a past medical history of CHF, hypertension, hypothyroidism, Alzheimer's and BPH LTC patient currently residing at [facility].    Today patient reports that he feels well. He denies any CP, SOB, headaches, worsening of baseline leg swelling, trouble laying flat, or palpations. He states that he has a good appetite and has tolerated his bowels well.       The following portions of the patient's history were reviewed and updated as appropriate: allergies, current medications, past family history, past medical history, past social history, past surgical history and problem list.    Review of Systems     Review of Systems   Constitutional: Negative.  Negative for chills, fever and unexpected weight change.   Eyes: Negative.    Respiratory: Negative.  Negative for cough, shortness of breath and wheezing.    Cardiovascular:  Negative for chest pain and palpitations. Leg swelling: per pt baseline.       Pt reports baseline leg edema    Gastrointestinal: Negative.  Negative for abdominal pain, constipation, diarrhea, nausea and vomiting.   Endocrine: Negative.    Genitourinary: Negative.        Active Problem List   Problem List[1]    Objective     Vitals: Visit Vitals  Smoking Status Former        Physical Exam  Vitals reviewed.   Constitutional:       General: He is not in acute distress.     Appearance: He is not ill-appearing, toxic-appearing or diaphoretic.   HENT:      Head:      Comments: Missing teeth      Nose: No congestion.      Mouth/Throat:      Mouth: Mucous membranes are moist.     Eyes:      Conjunctiva/sclera: Conjunctivae normal.       Cardiovascular:      Rate and Rhythm: Normal rate and regular rhythm.      Heart sounds: No murmur heard.  Pulmonary:      Effort: Pulmonary effort is normal. No respiratory distress.      Breath sounds: Normal breath sounds. No stridor. No wheezing, rhonchi or rales.   Chest:       Chest wall: No tenderness.     Musculoskeletal:      Right lower leg: Edema present.      Left lower leg: Edema present.     Neurological:      Mental Status: He is alert.      Comments: Patient able to have contestation  Knows self and location          Pertinent Laboratory/Diagnostic Studies:  Refer to facility chart.    Current Medications   Medications reviewed and updated in facility chart.            [1]   Patient Active Problem List  Diagnosis    Chronic cough    Coronary artery disease involving native heart without angina pectoris    Chronic diastolic (congestive) heart failure (HCC)    Essential hypertension    Late onset Alzheimer's dementia without behavioral disturbance (HCC)    BPH (benign prostatic hyperplasia)    Dermatitis associated with incontinence    Ambulatory dysfunction    Seborrheic keratosis    Hypothyroidism    Vitamin D deficiency    Hypertensive heart disease with congestive heart failure (HCC)

## 2025-05-30 NOTE — ASSESSMENT & PLAN NOTE
Patient with history of ambulatory dysfunction thought to be multifactorial  Currently wheel chair bound  Reports no recent falls since last seen   Recommend fall precautions

## 2025-06-07 RX ORDER — LEVOTHYROXINE SODIUM 50 UG/1
50 TABLET ORAL DAILY
COMMUNITY

## 2025-07-21 ENCOUNTER — NURSING HOME VISIT (OUTPATIENT)
Dept: GERIATRICS | Facility: OTHER | Age: OVER 89
End: 2025-07-21
Payer: COMMERCIAL

## 2025-07-21 DIAGNOSIS — E03.9 HYPOTHYROIDISM, UNSPECIFIED TYPE: ICD-10-CM

## 2025-07-21 DIAGNOSIS — F02.80 LATE ONSET ALZHEIMER'S DEMENTIA WITHOUT BEHAVIORAL DISTURBANCE (HCC): ICD-10-CM

## 2025-07-21 DIAGNOSIS — I50.32 CHRONIC DIASTOLIC (CONGESTIVE) HEART FAILURE (HCC): ICD-10-CM

## 2025-07-21 DIAGNOSIS — I10 ESSENTIAL HYPERTENSION: ICD-10-CM

## 2025-07-21 DIAGNOSIS — R26.2 AMBULATORY DYSFUNCTION: Primary | ICD-10-CM

## 2025-07-21 DIAGNOSIS — N40.0 BENIGN PROSTATIC HYPERPLASIA, UNSPECIFIED WHETHER LOWER URINARY TRACT SYMPTOMS PRESENT: ICD-10-CM

## 2025-07-21 DIAGNOSIS — G30.1 LATE ONSET ALZHEIMER'S DEMENTIA WITHOUT BEHAVIORAL DISTURBANCE (HCC): ICD-10-CM

## 2025-07-21 PROCEDURE — 99309 SBSQ NF CARE MODERATE MDM 30: CPT | Performed by: FAMILY MEDICINE
